# Patient Record
Sex: FEMALE | Race: BLACK OR AFRICAN AMERICAN | Employment: FULL TIME | URBAN - METROPOLITAN AREA
[De-identification: names, ages, dates, MRNs, and addresses within clinical notes are randomized per-mention and may not be internally consistent; named-entity substitution may affect disease eponyms.]

---

## 2018-08-13 ENCOUNTER — OFFICE VISIT (OUTPATIENT)
Dept: FAMILY MEDICINE CLINIC | Age: 28
End: 2018-08-13
Payer: COMMERCIAL

## 2018-08-13 ENCOUNTER — TELEPHONE (OUTPATIENT)
Dept: FAMILY MEDICINE CLINIC | Age: 28
End: 2018-08-13

## 2018-08-13 VITALS
HEART RATE: 71 BPM | BODY MASS INDEX: 17.52 KG/M2 | OXYGEN SATURATION: 98 % | SYSTOLIC BLOOD PRESSURE: 108 MMHG | WEIGHT: 109 LBS | DIASTOLIC BLOOD PRESSURE: 60 MMHG | RESPIRATION RATE: 16 BRPM | HEIGHT: 66 IN

## 2018-08-13 DIAGNOSIS — Z23 NEED FOR TDAP VACCINATION: ICD-10-CM

## 2018-08-13 DIAGNOSIS — Z76.89 ENCOUNTER TO ESTABLISH CARE: Primary | ICD-10-CM

## 2018-08-13 DIAGNOSIS — F32.A DEPRESSION, UNSPECIFIED DEPRESSION TYPE: ICD-10-CM

## 2018-08-13 DIAGNOSIS — R06.83 SNORING: ICD-10-CM

## 2018-08-13 DIAGNOSIS — R11.0 NAUSEA: ICD-10-CM

## 2018-08-13 DIAGNOSIS — Z11.4 SCREENING FOR HIV (HUMAN IMMUNODEFICIENCY VIRUS): ICD-10-CM

## 2018-08-13 DIAGNOSIS — L29.9 ITCHING: ICD-10-CM

## 2018-08-13 DIAGNOSIS — N92.6 IRREGULAR MENSTRUAL BLEEDING: ICD-10-CM

## 2018-08-13 LAB
CONTROL: NORMAL
PREGNANCY TEST URINE, POC: NEGATIVE

## 2018-08-13 PROCEDURE — 99204 OFFICE O/P NEW MOD 45 MIN: CPT | Performed by: FAMILY MEDICINE

## 2018-08-13 PROCEDURE — G8419 CALC BMI OUT NRM PARAM NOF/U: HCPCS | Performed by: FAMILY MEDICINE

## 2018-08-13 PROCEDURE — 90471 IMMUNIZATION ADMIN: CPT | Performed by: FAMILY MEDICINE

## 2018-08-13 PROCEDURE — G8427 DOCREV CUR MEDS BY ELIG CLIN: HCPCS | Performed by: FAMILY MEDICINE

## 2018-08-13 PROCEDURE — 1036F TOBACCO NON-USER: CPT | Performed by: FAMILY MEDICINE

## 2018-08-13 PROCEDURE — 81025 URINE PREGNANCY TEST: CPT | Performed by: FAMILY MEDICINE

## 2018-08-13 PROCEDURE — 90715 TDAP VACCINE 7 YRS/> IM: CPT | Performed by: FAMILY MEDICINE

## 2018-08-13 RX ORDER — RANITIDINE 150 MG/1
TABLET ORAL
Refills: 4 | COMMUNITY
Start: 2018-08-08 | End: 2019-03-26

## 2018-08-13 RX ORDER — NORETHINDRONE ACETATE AND ETHINYL ESTRADIOL 1; 20 MG/1; UG/1
TABLET ORAL
Refills: 1 | COMMUNITY
Start: 2018-08-08 | End: 2018-11-12 | Stop reason: SDUPTHER

## 2018-08-13 RX ORDER — OMEPRAZOLE 20 MG/1
20 CAPSULE, DELAYED RELEASE ORAL DAILY
Qty: 30 CAPSULE | Refills: 3 | Status: SHIPPED | OUTPATIENT
Start: 2018-08-13 | End: 2018-11-30

## 2018-08-13 RX ORDER — FLUTICASONE PROPIONATE 50 MCG
1 SPRAY, SUSPENSION (ML) NASAL DAILY
Qty: 1 BOTTLE | Refills: 3 | Status: SHIPPED | OUTPATIENT
Start: 2018-08-13

## 2018-08-13 RX ORDER — ESCITALOPRAM OXALATE 20 MG/1
20 TABLET ORAL DAILY
COMMUNITY
End: 2019-03-26

## 2018-08-13 RX ORDER — CETIRIZINE HYDROCHLORIDE 10 MG/1
TABLET ORAL
Refills: 4 | COMMUNITY
Start: 2018-08-08 | End: 2019-04-05 | Stop reason: ALTCHOICE

## 2018-08-13 ASSESSMENT — ENCOUNTER SYMPTOMS
COUGH: 0
EYE PAIN: 0
WHEEZING: 0
VOMITING: 0
CONSTIPATION: 0
DIARRHEA: 0
DOUBLE VISION: 0
SHORTNESS OF BREATH: 0
NAUSEA: 1
BLURRED VISION: 0
BACK PAIN: 0

## 2018-08-13 ASSESSMENT — PATIENT HEALTH QUESTIONNAIRE - PHQ9
SUM OF ALL RESPONSES TO PHQ QUESTIONS 1-9: 2
2. FEELING DOWN, DEPRESSED OR HOPELESS: 2
SUM OF ALL RESPONSES TO PHQ9 QUESTIONS 1 & 2: 2
1. LITTLE INTEREST OR PLEASURE IN DOING THINGS: 0
SUM OF ALL RESPONSES TO PHQ QUESTIONS 1-9: 2

## 2018-08-13 NOTE — PROGRESS NOTES
detergents, perfumes   - Improves with cetirizine and ranitidine   - Nothing worsens hives  - OTC creams don't work   - Saw allergist in [place], who prescribed antihistamines (ranitidine and cetirizine)  - Patient reports that she received blood testing for allergies, but had difficulty contacting allergist's office for concrete results      4. Nausea  - Has been occurring for 3 weeks   - Nausea \"comes and goes\"  - Denies vomiting, diarrhea, constipation, fevers, chills, diaphoresis, recent sick contacts, and blood in stool  - Patient reports that she sometimes forgets to take her OCPs. She explains that she hasn't had any \"real\" menstrual cycles since transitioning from Nexplanon to OCPs as her method of birth control  - Nausea worse with eating and after eating       5. Snoring  - Feels tired when she awakens in the morning  - Patient's partner states that patient snores a lot while sleeping  - Patient awakes with headaches in the morning  - Sometimes she feels like she is in a fog and has difficulty concentrating   - Naps do not help with her fatigue   - She has never had a sleep study    Past Medical History:      Diagnosis Date    Anemia     Depression     Seasonal allergies     Sickle cell trait ([facility] [address]        Past Surgical History:    History reviewed. No pertinent surgical history. Allergies:    Patient has no known allergies. Social History:   Social History     Social History    Marital status:      Spouse name: N/A    Number of children: N/A    Years of education: N/A     Occupational History    Not on file.      Social History Main Topics    Smoking status: Never Smoker    Smokeless tobacco: Never Used    Alcohol use No    Drug use: No    Sexual activity: Yes     Partners: Male     Birth control/ protection: Pill      Comment: Partner      Other Topics Concern    Not on file     Social History Narrative    No narrative on file        Family History:   Family History   Problem Patellar reflexes intact bilaterally. Psychiatric: Normal mood and affect. Behavior is normal. Thought content appropriate. Assessment and Plan     1. Encounter to establish care    2. Irregular menstrual bleeding  Patient reports that she has been experiencing irregular menstrual bleeding and nausea. A urine pregnancy test was ordered in the office, which was negative. - POCT urine pregnancy    3. Itching  As patient has been having intermittent itching for over 2 years with no relief, I am referring her to an allergist for allergy testing.   - [facility] Gemini Quinn MD    4. Snoring  - Based on patient's history regarding her sleep and snoring, I am referring patient for a sleep study to r/o obstructive and central sleep apnea. - Baseline Diagnostic Sleep Study; Future    5. Depression, unspecified depression type  Patient was counseled to take her Lexapro on a daily basis. Referral was made for counseling for depression.  - External Referral To Psychology    6. Screening for HIV (human immunodeficiency virus)  - HIV-1 AND HIV-2 ANTIBODIES; Future    7. Need for Tdap vaccination  - Tdap (age 6y and older) IM ([address])    8. Nausea  Will start a trial of PPI to see if patient's symptoms improve. If no improvement, further evaluation, such as an endoscopy, may be warranted. - omeprazole (PRILOSEC) 20 MG delayed release capsule; Take 1 capsule by mouth daily  Dispense: 30 capsule; Refill: 3      Counseled regarding above diagnosis, including possible risks and complications,  especially if left uncontrolled. Counseled regarding the possible side effects, risks, benefits and alternatives to treatment; patient and/or guardian verbalizes understanding, agrees, feels comfortable with and wishes to proceed with above treatment plan. Patient and/or guardian given opportunity to ask questions/raise concerns.   The patient verbalized comfort and understanding of instructions. Return to Office: Schedule well-woman exam at earliest convenience. Follow-up for mood, skin, nausea, and snoring in 3 months or sooner if symptoms worsen. Medication List:    Current Outpatient Prescriptions   Medication Sig Dispense Refill    cetirizine (ZYRTEC) 10 MG tablet TAKE 1 TABLET BY MOUTH TWICE A DAY FOR CHRONIC HIVES  4    ranitidine (ZANTAC) 150 MG tablet TAKE 1 TABLET BY MOUTH TWICE A DAY FOR CHRONIC HIVES  4    JUNEL 1/20 1-20 MG-MCG per tablet TAKE 1 TABLET BY MOUTH DAILY AS DIRECTED  1    escitalopram (LEXAPRO) 20 MG tablet Take 20 mg by mouth daily      omeprazole (PRILOSEC) 20 MG delayed release capsule Take 1 capsule by mouth daily 30 capsule 3    fluticasone (FLONASE) 50 MCG/ACT nasal spray 1 spray by Nasal route daily 1 Bottle 3     No current facility-administered medications for this visit.          DO Sivan   8/13/18  6:39 PM

## 2018-08-19 NOTE — PROGRESS NOTES
Okay! Thanks for letting me know! And yes, I still have a lot to learn in regards to billing and coding!

## 2018-08-24 ENCOUNTER — HOSPITAL ENCOUNTER (OUTPATIENT)
Age: 28
Discharge: HOME | End: 2018-08-24
Payer: COMMERCIAL

## 2018-08-24 ENCOUNTER — OFFICE VISIT (OUTPATIENT)
Dept: FAMILY MEDICINE CLINIC | Age: 28
End: 2018-08-24
Payer: COMMERCIAL

## 2018-08-24 ENCOUNTER — HOSPITAL ENCOUNTER (OUTPATIENT)
Age: 28
Discharge: HOME | End: 2018-08-26
Payer: COMMERCIAL

## 2018-08-24 VITALS
BODY MASS INDEX: 17.68 KG/M2 | HEART RATE: 83 BPM | OXYGEN SATURATION: 98 % | RESPIRATION RATE: 18 BRPM | WEIGHT: 110 LBS | DIASTOLIC BLOOD PRESSURE: 58 MMHG | HEIGHT: 66 IN | SYSTOLIC BLOOD PRESSURE: 96 MMHG

## 2018-08-24 DIAGNOSIS — N92.6 IRREGULAR MENSTRUAL BLEEDING: ICD-10-CM

## 2018-08-24 DIAGNOSIS — Z87.42 HISTORY OF ABNORMAL CERVICAL PAP SMEAR: Primary | ICD-10-CM

## 2018-08-24 DIAGNOSIS — Z11.4 ENCOUNTER FOR SCREENING FOR HIV: ICD-10-CM

## 2018-08-24 LAB — TSH SERPL DL<=0.05 MIU/L-ACNC: 0.88 UIU/ML (ref 0.27–4.2)

## 2018-08-24 PROCEDURE — 36415 COLL VENOUS BLD VENIPUNCTURE: CPT

## 2018-08-24 PROCEDURE — 86703 HIV-1/HIV-2 1 RESULT ANTBDY: CPT

## 2018-08-24 PROCEDURE — 1036F TOBACCO NON-USER: CPT | Performed by: FAMILY MEDICINE

## 2018-08-24 PROCEDURE — G8427 DOCREV CUR MEDS BY ELIG CLIN: HCPCS | Performed by: FAMILY MEDICINE

## 2018-08-24 PROCEDURE — 87624 HPV HI-RISK TYP POOLED RSLT: CPT

## 2018-08-24 PROCEDURE — G8419 CALC BMI OUT NRM PARAM NOF/U: HCPCS | Performed by: FAMILY MEDICINE

## 2018-08-24 PROCEDURE — 84443 ASSAY THYROID STIM HORMONE: CPT

## 2018-08-24 PROCEDURE — G0123 SCREEN CERV/VAG THIN LAYER: HCPCS

## 2018-08-24 PROCEDURE — 99213 OFFICE O/P EST LOW 20 MIN: CPT | Performed by: FAMILY MEDICINE

## 2018-08-27 LAB — HIV-1 AND HIV-2 ANTIBODIES: NORMAL

## 2018-08-30 LAB
CORRESPONDING PAP CASE #: NORMAL
HPV, HIGH RISK: POSITIVE

## 2018-09-03 ENCOUNTER — APPOINTMENT (OUTPATIENT)
Dept: GENERAL RADIOLOGY | Age: 28
End: 2018-09-03
Payer: COMMERCIAL

## 2018-09-03 ENCOUNTER — HOSPITAL ENCOUNTER (EMERGENCY)
Age: 28
Discharge: HOME | End: 2018-09-03
Attending: EMERGENCY MEDICINE
Payer: COMMERCIAL

## 2018-09-03 VITALS
DIASTOLIC BLOOD PRESSURE: 64 MMHG | WEIGHT: 110 LBS | RESPIRATION RATE: 14 BRPM | BODY MASS INDEX: 17.68 KG/M2 | SYSTOLIC BLOOD PRESSURE: 112 MMHG | HEART RATE: 76 BPM | HEIGHT: 66 IN | OXYGEN SATURATION: 98 % | TEMPERATURE: 98.7 F

## 2018-09-03 DIAGNOSIS — B37.9 YEAST INFECTION: ICD-10-CM

## 2018-09-03 DIAGNOSIS — N89.8 VAGINAL DISCHARGE: ICD-10-CM

## 2018-09-03 DIAGNOSIS — R05.9 COUGH: Primary | ICD-10-CM

## 2018-09-03 LAB
BACTERIA: ABNORMAL /HPF
BILIRUBIN URINE: NEGATIVE
BLOOD, URINE: ABNORMAL
CHP ED QC CHECK: NORMAL
CHP ED QC CHECK: YES
CLARITY: CLEAR
CLUE CELLS: NORMAL
COLOR: YELLOW
EPITHELIAL CELLS, UA: ABNORMAL /HPF
GLUCOSE URINE: NEGATIVE MG/DL
KETONES, URINE: NEGATIVE MG/DL
LEUKOCYTE ESTERASE, URINE: ABNORMAL
NITRITE, URINE: NEGATIVE
PH UA: 6.5 (ref 5–9)
PREGNANCY TEST URINE, POC: NEGATIVE
PREGNANCY TEST URINE, POC: NEGATIVE
PROTEIN UA: NEGATIVE MG/DL
RBC UA: ABNORMAL /HPF (ref 0–2)
SOURCE WET PREP: NORMAL
SPECIFIC GRAVITY UA: 1.01 (ref 1–1.03)
TRICHOMONAS PREP: NORMAL
UROBILINOGEN, URINE: 1 E.U./DL
WBC UA: ABNORMAL /HPF (ref 0–5)
YEAST WET PREP: NORMAL

## 2018-09-03 PROCEDURE — 71045 X-RAY EXAM CHEST 1 VIEW: CPT

## 2018-09-03 PROCEDURE — 6370000000 HC RX 637 (ALT 250 FOR IP): Performed by: EMERGENCY MEDICINE

## 2018-09-03 PROCEDURE — 2500000003 HC RX 250 WO HCPCS: Performed by: EMERGENCY MEDICINE

## 2018-09-03 PROCEDURE — 87491 CHLMYD TRACH DNA AMP PROBE: CPT

## 2018-09-03 PROCEDURE — 81001 URINALYSIS AUTO W/SCOPE: CPT

## 2018-09-03 PROCEDURE — 96372 THER/PROPH/DIAG INJ SC/IM: CPT

## 2018-09-03 PROCEDURE — 73562 X-RAY EXAM OF KNEE 3: CPT

## 2018-09-03 PROCEDURE — 6360000002 HC RX W HCPCS: Performed by: EMERGENCY MEDICINE

## 2018-09-03 PROCEDURE — 87210 SMEAR WET MOUNT SALINE/INK: CPT

## 2018-09-03 PROCEDURE — 87591 N.GONORRHOEAE DNA AMP PROB: CPT

## 2018-09-03 PROCEDURE — 99283 EMERGENCY DEPT VISIT LOW MDM: CPT

## 2018-09-03 RX ORDER — AZITHROMYCIN 250 MG/1
1000 TABLET, FILM COATED ORAL ONCE
Status: COMPLETED | OUTPATIENT
Start: 2018-09-03 | End: 2018-09-03

## 2018-09-03 RX ORDER — IBUPROFEN 800 MG/1
800 TABLET ORAL ONCE
Status: COMPLETED | OUTPATIENT
Start: 2018-09-03 | End: 2018-09-03

## 2018-09-03 RX ORDER — FLUCONAZOLE 100 MG/1
200 TABLET ORAL ONCE
Status: COMPLETED | OUTPATIENT
Start: 2018-09-03 | End: 2018-09-03

## 2018-09-03 RX ADMIN — FLUCONAZOLE 200 MG: 100 TABLET ORAL at 14:20

## 2018-09-03 RX ADMIN — AZITHROMYCIN 1000 MG: 250 TABLET, FILM COATED ORAL at 13:35

## 2018-09-03 RX ADMIN — LIDOCAINE HYDROCHLORIDE 250 MG: 10 INJECTION, SOLUTION INFILTRATION; PERINEURAL at 13:35

## 2018-09-03 RX ADMIN — IBUPROFEN 800 MG: 800 TABLET ORAL at 12:44

## 2018-09-03 ASSESSMENT — ENCOUNTER SYMPTOMS
BACK PAIN: 0
ABDOMINAL PAIN: 0
COUGH: 0
DIARRHEA: 0
BLOOD IN STOOL: 0
CONSTIPATION: 0
VOMITING: 0
SHORTNESS OF BREATH: 0
NAUSEA: 0

## 2018-09-03 ASSESSMENT — PAIN DESCRIPTION - LOCATION: LOCATION: KNEE

## 2018-09-03 ASSESSMENT — PAIN DESCRIPTION - ORIENTATION: ORIENTATION: LEFT

## 2018-09-03 ASSESSMENT — PAIN SCALES - GENERAL
PAINLEVEL_OUTOF10: 5
PAINLEVEL_OUTOF10: 5

## 2018-09-03 ASSESSMENT — PAIN DESCRIPTION - PAIN TYPE: TYPE: ACUTE PAIN

## 2018-09-03 ASSESSMENT — PAIN DESCRIPTION - DESCRIPTORS: DESCRIPTORS: THROBBING

## 2018-09-03 ASSESSMENT — PAIN DESCRIPTION - FREQUENCY: FREQUENCY: INTERMITTENT

## 2018-09-03 NOTE — ED NOTES
Bed: 28  Expected date:   Expected time:   Means of arrival:   Comments:  Exam in process     Estephania Martínez LPN  [id number] [phone]

## 2018-09-03 NOTE — ED PROVIDER NOTES
49-year-old female presents ED with chief complaint of vaginal discharge yellow in color for the past 3 days. Patient states she is also been itching and burning in the vagina. She does have a history of BV and yeast infections. Patient recently had a colposcopy and Pap smear done that showed HPV. In addition, patient also states she has a history of sinus infections and has had sinus congestion and cough as well as chest congestion. No fever at home. No trouble breathing. No chest pain. No dysuria. Review of Systems   Constitutional: Negative for chills and fever. Respiratory: Negative for cough and shortness of breath. Cardiovascular: Negative for chest pain. Gastrointestinal: Negative for abdominal pain, blood in stool, constipation, diarrhea, nausea and vomiting. Genitourinary: Positive for vaginal discharge (Yellowish vaginal discharge with itching and burning, cough and left knee pain). Negative for dysuria, frequency, hematuria, pelvic pain, vaginal bleeding and vaginal pain. Musculoskeletal: Negative for back pain. Skin: Negative for rash. Neurological: Negative for dizziness, weakness, light-headedness and headaches. All other systems reviewed and are negative. Physical Exam   Constitutional: She appears well-developed and well-nourished. HENT:   Head: Normocephalic and atraumatic. Eyes: Conjunctivae are normal.   Neck: Normal range of motion. Neck supple. Cardiovascular: Normal rate, regular rhythm and normal heart sounds. No murmur heard. Pulmonary/Chest: Effort normal and breath sounds normal. No respiratory distress. She has no wheezes. She has no rales. Abdominal: Soft. Bowel sounds are normal. There is no tenderness. There is no rebound and no guarding. Genitourinary: Vaginal discharge (Abnormal-appearing cervix with excoriation, yellowish vaginal discharge, and yeast) found. Musculoskeletal: She exhibits no edema. Neurological: She is alert. Skin: Skin is warm and dry. Nursing note and vitals reviewed. Procedures  --------------------------------------------- PAST HISTORY ---------------------------------------------  Past Medical History:  has a past medical history of Anemia; Depression; Seasonal allergies; and Sickle cell trait ([facility] [address] Past Surgical History:  has no past surgical history on file. Social History:  reports that she has never smoked. She has never used smokeless tobacco. She reports that she does not drink alcohol or use drugs. Family History: family history includes Asthma in her brother; Cancer in her maternal aunt; Hypertension in her maternal grandmother; Lupus in her maternal cousin and maternal cousin; No Known Problems in her brother, brother, and sister; Other in her sister; Thyroid Disease in her mother. The patients home medications have been reviewed. Allergies: Patient has no known allergies.     -------------------------------------------------- RESULTS -------------------------------------------------  Labs:  Results for orders placed or performed during the hospital encounter of 09/03/18   Wet prep, genital   Result Value Ref Range    Trichomonas Prep None Seen     Yeast, Wet Prep None Seen     Clue Cells, Wet Prep None Seen     Source Wet Prep VAGINAL    Urinalysis with Microscopic   Result Value Ref Range    Color, UA Yellow Straw/Yellow    Clarity, UA Clear Clear    Glucose, Ur Negative Negative mg/dL    Bilirubin Urine Negative Negative    Ketones, Urine Negative Negative mg/dL    Specific Gravity, UA 1.015 1.005 - 1.030    Blood, Urine TRACE-LYSED Negative    pH, UA 6.5 5.0 - 9.0    Protein, UA Negative Negative mg/dL    Urobilinogen, Urine 1.0 <2.0 E.U./dL    Nitrite, Urine Negative Negative    Leukocyte Esterase, Urine SMALL (A) Negative    WBC, UA 1-3 0 - 5 /HPF    RBC, UA 0-1 0 - 2 /HPF    Epi Cells RARE /HPF    Bacteria, UA RARE (A) /HPF   POC Pregnancy Urine Qual   Result Value Ref Range

## 2018-09-06 ENCOUNTER — TELEPHONE (OUTPATIENT)
Dept: FAMILY MEDICINE CLINIC | Age: 28
End: 2018-09-06

## 2018-09-18 ENCOUNTER — TELEPHONE (OUTPATIENT)
Dept: FAMILY MEDICINE CLINIC | Age: 28
End: 2018-09-18

## 2018-09-18 NOTE — TELEPHONE ENCOUNTER
Patient called and wants to know, while here on Friday for her ultrasound, if Dr Margarita Kline can write a small letter stating patient is cleared for employment. She starts a new job on Monday and will need a statement stating she is medically cleared for employment. She wants someone to call her back and let her know if this is possible.  Lazara Fritz [phone]  Thank you

## 2018-09-21 ENCOUNTER — OFFICE VISIT (OUTPATIENT)
Dept: FAMILY MEDICINE CLINIC | Age: 28
End: 2018-09-21
Payer: COMMERCIAL

## 2018-09-21 ENCOUNTER — HOSPITAL ENCOUNTER (OUTPATIENT)
Age: 28
Discharge: HOME | End: 2018-09-23
Payer: COMMERCIAL

## 2018-09-21 VITALS
BODY MASS INDEX: 17.68 KG/M2 | WEIGHT: 110 LBS | SYSTOLIC BLOOD PRESSURE: 98 MMHG | TEMPERATURE: 98.6 F | OXYGEN SATURATION: 98 % | RESPIRATION RATE: 16 BRPM | HEIGHT: 66 IN | HEART RATE: 79 BPM | DIASTOLIC BLOOD PRESSURE: 68 MMHG

## 2018-09-21 DIAGNOSIS — R87.629 ABNORMAL PAP SMEAR OF VAGINA: ICD-10-CM

## 2018-09-21 DIAGNOSIS — Z23 NEED FOR INFLUENZA VACCINATION: ICD-10-CM

## 2018-09-21 DIAGNOSIS — R87.612 LGSIL ON PAP SMEAR OF CERVIX: Primary | ICD-10-CM

## 2018-09-21 LAB
CONTROL: NORMAL
PREGNANCY TEST URINE, POC: NORMAL

## 2018-09-21 PROCEDURE — 90471 IMMUNIZATION ADMIN: CPT | Performed by: FAMILY MEDICINE

## 2018-09-21 PROCEDURE — 57454 BX/CURETT OF CERVIX W/SCOPE: CPT | Performed by: FAMILY MEDICINE

## 2018-09-21 PROCEDURE — 88305 TISSUE EXAM BY PATHOLOGIST: CPT

## 2018-09-21 PROCEDURE — 81025 URINE PREGNANCY TEST: CPT | Performed by: FAMILY MEDICINE

## 2018-09-21 PROCEDURE — 90686 IIV4 VACC NO PRSV 0.5 ML IM: CPT | Performed by: FAMILY MEDICINE

## 2018-09-21 NOTE — PATIENT INSTRUCTIONS
time you have sex. Use them from the beginning to the end of sexual contact. · Be sure to tell your sexual partner or partners that you have HPV. Even if you do not have symptoms, you can still pass HPV to others. · Having one sex partner (who does not have STIs and does not have sex with anyone else) is a good way to avoid STIs. When should you call for help? Watch closely for changes in your health, and be sure to contact your doctor if:    · You have vaginal pain during or after sex.     · You have vaginal bleeding when you are not in your menstrual period. Where can you learn more? Go to https://chpeodiliaeb.[facility]. org and sign in to your [facility] account. Enter F690 in the Sensorin box to learn more about \"Human Papillomavirus (HPV): Care Instructions. \"     If you do not have an account, please click on the \"Sign Up Now\" link. Current as of: November 27, 2017  Content Version: 11.7  © 4895-9740 [employer]. Care instructions adapted under license by [facility] ([facility]). If you have questions about a medical condition or this instruction, always ask your healthcare professional. [place] [address] any warranty or liability for your use of this information. Abnormal Pap Test: Care Instructions  Your Care Instructions    A Pap test (also called a Pap smear) is used to look for early changes that may become cancer of the cervix. Your Pap test was abnormal. That may mean that some cells in your cervix have changed. The cell changes are most often caused by human papillomavirus (HPV) infection. An abnormal Pap test does not mean that the abnormal cells will lead to cancer. Changes in cervical cells may go away on their own or may progress slowly. Your doctor may want to repeat the Pap test or have you take other tests to see if you have cell changes.   It is very important that you have regular Pap tests after you've had an abnormal Pap test.  Follow-up care

## 2018-09-21 NOTE — PROGRESS NOTES
Chief complaint : Presents for colposcopy . The patient is fully informed about the indications, risks and alternatives . The risks include but are not limited to bleeding and infection. A urine pregnancy test is negative. The indication is a pap smear showing  : LGSIL and positive HPV    Procedure :    General inspection : lesion at lower lip  After application of acetic acid, the following is noted :extensive AWE posterior cervix. The colposcopy is satisfactory. Biopsy  obtained at 9 o'clock. Hemostasis secured with application of silver nitrate. Assessment : LGSIL. Plan :follow up in 2 weeks for US and ECC. The patient is again reminded of the importance of close follow-up including repeat Pap smears in order to help reduce the risk of cervical cancer.

## 2018-09-21 NOTE — PROGRESS NOTES
[facility]  Department of Family Medicine  Family Medicine Residency Program      Patient:  Donato Rivera 32 y.o. female     Date of Service: 9/21/18      Chief complaint:   Chief Complaint   Patient presents with    Abnormal Pap Smear     Colposcopy    Forms     Needs a letter stating that she is physically able to work with no restrictions       History of Present Illness   The patient is a 32 y.o. female with a hx of abnormal Pap smears in the past who presents to the clinic today for a colposcopy due to a recent abnormal Pap smear showing LGSIL with positive HPV. -Patient reports that she is still having irregular menstrual bleeding   -Has been remembering to take her OCPs every day at the same time   -Denies hematuria, dysuria, urinary frequency, urinary urgency, history of STIs, genital lesions, genital itching, nipple discharge, breast lumps/masses, breast dimpling, and axillary lymphadenopathy  -Admits to creamy vaginal discharge and a tingling sensation in the genital region    Review of Systems:   Review of Systems   Genitourinary: Negative for dysuria, frequency, hematuria and urgency. Denies genital lesions, genital itching, and history of STIs. Admits to creamy vaginal discharge, irregular menstrual bleeding, and a tingling sensation in the genital region. Breast: Denies nipple discharge, breast masses/lumps, breast dimpling, and axillary lymphadenopathy     Past Medical History:      Diagnosis Date    Anemia     Depression     Seasonal allergies     Sickle cell trait (HCC)        Past Surgical History:    No past surgical history on file. Allergies:    Patient has no known allergies. Social History:   Social History     Social History    Marital status:      Spouse name: N/A    Number of children: 2    Years of education: N/A     Occupational History    Not on file.      Social History Main Topics    Smoking status: Never Smoker    Smokeless tobacco: Never Used    Alcohol use No    Drug use: No    Sexual activity: Yes     Partners: Male     Birth control/ protection: Pill      Comment: Partner      Other Topics Concern    Not on file     Social History Narrative    No narrative on file        Family History:   Family History   Problem Relation Age of Onset    Thyroid Disease Mother     Other Sister         Heart murmur as a child    Asthma Brother     Hypertension Maternal Grandmother     Cancer Maternal Aunt     No Known Problems Sister     No Known Problems Brother     No Known Problems Brother     Lupus Maternal Cousin     Lupus Maternal Cousin        Physical Exam   Vitals: BP 98/68   Pulse 79   Temp 98.6 °F (37 °C) (Oral)   Resp 16   Ht 5' 6\" (1.676 m)   Wt 110 lb (49.9 kg)   LMP 09/04/2018 (Exact Date)   SpO2 98%   Breastfeeding? No   BMI 17.75 kg/m²    General Appearance: Well developed, awake, alert, no acute distress  Breast Exam: Bilateral breasts are symmetric without dimpling or nipple retractions. No palpable masses or nodules. No skin changes. No nipple discharge. Axilla free of lymphadenopathy. : Colposcopy performed. Cervix shows diffuse acetowhite changes with application of acetic acid. Biopsy performed. See note from Dr. Autumn Brannon for additional details. Assessment and Plan       1. LGSIL on Pap smear of cervix  - [address] - [place] COLPOSC,CERVIX W/ADJ VAG,W/BX & CURRETAG  - SURGICAL PATHOLOGY; Future   Will contact patient with result of colposcopy once it becomes available. Depending on results, patient made need a referral to OBGYN for possible LEEP. 2. Abnormal Pap smear of vagina  - POCT urine pregnancy  Urine pregnancy test today was negative, so we proceeded with colposcopy. 3. Need for influenza vaccination  - INFLUENZA, QUADV, 3 YRS AND OLDER, IM, PF, PREFILL SYR OR SDV, 0.5ML (FLUZONE QUADV, PF)    Patient signed a release of records today. Will review those records once I receive them.  A

## 2018-09-24 ENCOUNTER — HOSPITAL ENCOUNTER (OUTPATIENT)
Dept: SLEEP CENTER | Age: 28
Discharge: HOME | End: 2018-09-24
Payer: COMMERCIAL

## 2018-09-24 DIAGNOSIS — R06.83 SNORING: ICD-10-CM

## 2018-09-24 PROCEDURE — 95810 POLYSOM 6/> YRS 4/> PARAM: CPT

## 2018-09-25 VITALS
WEIGHT: 110 LBS | OXYGEN SATURATION: 97 % | HEIGHT: 66 IN | BODY MASS INDEX: 17.68 KG/M2 | DIASTOLIC BLOOD PRESSURE: 70 MMHG | HEART RATE: 70 BPM | SYSTOLIC BLOOD PRESSURE: 110 MMHG

## 2018-09-25 ASSESSMENT — SLEEP AND FATIGUE QUESTIONNAIRES
HOW LIKELY ARE YOU TO NOD OFF OR FALL ASLEEP WHEN YOU ARE A PASSENGER IN A CAR FOR AN HOUR WITHOUT A BREAK: 3
HOW LIKELY ARE YOU TO NOD OFF OR FALL ASLEEP WHILE SITTING QUIETLY AFTER LUNCH WITHOUT ALCOHOL: 3
HOW LIKELY ARE YOU TO NOD OFF OR FALL ASLEEP WHILE SITTING AND TALKING TO SOMEONE: 3
HOW LIKELY ARE YOU TO NOD OFF OR FALL ASLEEP WHILE WATCHING TV: 3
ESS TOTAL SCORE: 18
HOW LIKELY ARE YOU TO NOD OFF OR FALL ASLEEP WHILE LYING DOWN TO REST IN THE AFTERNOON WHEN CIRCUMSTANCES PERMIT: 3
HOW LIKELY ARE YOU TO NOD OFF OR FALL ASLEEP WHILE SITTING INACTIVE IN A PUBLIC PLACE: 0
HOW LIKELY ARE YOU TO NOD OFF OR FALL ASLEEP WHILE SITTING AND READING: 3
HOW LIKELY ARE YOU TO NOD OFF OR FALL ASLEEP IN A CAR, WHILE STOPPED FOR A FEW MINUTES IN TRAFFIC: 0

## 2018-09-29 NOTE — PROGRESS NOTES
[id number] [address]                                SLEEP STUDY REPORT    PATIENT NAME: Karen Paris                    :        1990  MED REC NO:   [id number]                            ROOM:  ACCOUNT NO:   [de-identified]                           ADMIT DATE: 2018  PROVIDER:     Janet Mar MD    DATE OF STUDY:  2018    REFERRING PROVIDER:  Siva Myles DO    STUDY PERFORMED:  Polysomnography. INDICATION FOR POLYSOMNOGRAPHY:  Excessive napping, wakes gasping, snoring,  morning headache, trouble with memory/concentration, and nocturnal  diaphoresis. CURRENT MEDICATIONS:  Oral contraceptive, Lexapro, omeprazole, Flonase  nasal spray, and cetirizine. INTERPRETATION:  SLEEP ARCHITECTURE:  This patient had a total time in bed of 390 minutes. Total sleep time was 357 minutes. Sleep efficiency was 92%. Sleep latency  was 16 minutes. REM latency was 87 minutes. SLEEP STAGING:  The patient was awake 8% of the time in bed. Stage N1 was  3% N2 56% of the total sleep time. Slow wave sleep was 21% of the sleep  time and stage REM sleep was 20% of the sleep time. RESPIRATION SUMMARY:  APNEA:  There were three apneic events. Two were central and one  obstructive. All events occurred during REM stage sleep. Maximum duration  of an apneic event was 10 seconds. HYPOPNEA:  There were no hypopneic events. APNEA/HYPOPNEA INDEX:  The apnea/hypopnea index was less than one. All  events occurred in the supine position. AROUSAL SUMMARY:  During sleep, the patient had a total of 30  arousals/awakenings. The sleep disruption index was normal.    LIMB MOVEMENT:  There were nine total limb movements, the limb movement  index was normal.    OXYGEN SATURATION:  Average oxygen saturation while awake was 98%. Average  saturation during sleep was 97%. Minimum saturation was 95%.     HEART RATE SUMMARY:  Average baseline heart rate was 80 beats per minute. Maximum heart rate was 106 beats per minute and minimum heartbeat was 60  beats per minute. MISCELLANEOUS:  [place] sleepiness scale score is 18/24. Snoring was  moderate. This was graded as 6 on a 1 to 10 scale. There was no apparent  bruxism. IMPRESSION:  1. No evidence of sleep apnea syndrome. 2.  Excellent oxygen saturation. 3.  No cardiac dysrhythmia. 4.  Moderate snoring. DISCUSSION:  This patient has an apnea/hypopnea index of less than one. Normal is less than five leaving this patient in the normal category. Along with this, there was excellent oxygen saturation and no cardiac  dysrhythmia. Therefore, there is no indication that the patient should be  treated for sleep apnea syndrome. If snoring is bothersome to the patient  or bed partner, consider treating with nasal steroids, an oral appliance,  or even a snore pillow, which is now commercially available. SUGGESTIONS:  1.  Dr. Kavya Horowitz to discuss the results of study with the  patient. 2.  No indication to treat for sleep apnea syndrome. 3.  If snoring is bothersome to the patient or bed partner, consider  treating as described in the discussion above.         Amelie Carmona MD  Diplomat of Sleep Medicine    D: 09/28/2018 16:48:26       T: 09/28/2018 16:51:02     [email or web address]  Job#: [id number]     Doc#: [id number]    CC:

## 2018-10-01 ENCOUNTER — TELEPHONE (OUTPATIENT)
Dept: FAMILY MEDICINE CLINIC | Age: 28
End: 2018-10-01

## 2018-10-01 NOTE — TELEPHONE ENCOUNTER
Patient called and left a message. Stating that the bleeding started Friday as spotting, then by Sunday it was heavy bleeding.

## 2018-10-01 NOTE — TELEPHONE ENCOUNTER
Left detailed message for patient stating that we need to know how much bleeding she is having. If it is just spotting, if she is passing clots or is it heavy bleeding. To please give the office a call back and let us know.

## 2018-10-04 ENCOUNTER — OFFICE VISIT (OUTPATIENT)
Dept: FAMILY MEDICINE CLINIC | Age: 28
End: 2018-10-04
Payer: COMMERCIAL

## 2018-10-04 VITALS
RESPIRATION RATE: 16 BRPM | WEIGHT: 110 LBS | SYSTOLIC BLOOD PRESSURE: 102 MMHG | DIASTOLIC BLOOD PRESSURE: 64 MMHG | HEART RATE: 70 BPM | OXYGEN SATURATION: 98 % | HEIGHT: 66 IN | BODY MASS INDEX: 17.68 KG/M2

## 2018-10-04 DIAGNOSIS — Z98.890 H/O COLPOSCOPY WITH CERVICAL BIOPSY: ICD-10-CM

## 2018-10-04 DIAGNOSIS — R51.9 NONINTRACTABLE EPISODIC HEADACHE, UNSPECIFIED HEADACHE TYPE: ICD-10-CM

## 2018-10-04 DIAGNOSIS — N92.6 MENSTRUAL CYCLE PROBLEM: Primary | ICD-10-CM

## 2018-10-04 LAB
CONTROL: NORMAL
PREGNANCY TEST URINE, POC: NEGATIVE

## 2018-10-04 PROCEDURE — 1036F TOBACCO NON-USER: CPT | Performed by: FAMILY MEDICINE

## 2018-10-04 PROCEDURE — 81025 URINE PREGNANCY TEST: CPT | Performed by: FAMILY MEDICINE

## 2018-10-04 PROCEDURE — G8419 CALC BMI OUT NRM PARAM NOF/U: HCPCS | Performed by: FAMILY MEDICINE

## 2018-10-04 PROCEDURE — 99213 OFFICE O/P EST LOW 20 MIN: CPT | Performed by: FAMILY MEDICINE

## 2018-10-04 PROCEDURE — G8427 DOCREV CUR MEDS BY ELIG CLIN: HCPCS | Performed by: FAMILY MEDICINE

## 2018-10-04 PROCEDURE — G8482 FLU IMMUNIZE ORDER/ADMIN: HCPCS | Performed by: FAMILY MEDICINE

## 2018-10-04 ASSESSMENT — ENCOUNTER SYMPTOMS
BLOOD IN STOOL: 0
VOMITING: 0
PHOTOPHOBIA: 0
BLURRED VISION: 0
SORE THROAT: 0
NAUSEA: 0
SHORTNESS OF BREATH: 0
DOUBLE VISION: 0
COUGH: 0

## 2018-10-04 NOTE — PROGRESS NOTES
[facility]  Department of Family Medicine  Family Medicine Residency Program      Patient:  Kimani Reed 32 y.o. female     Date of Service: 10/4/18      Chief complaint:   Chief Complaint   Patient presents with    Results    Vaginal Bleeding    Headache       History of Present Illness   The patient is a 32 y.o. female who presents to the clinic today for the following concerns:     1. Vaginal bleeding  - S/p colposcopy on 9/21/18  - Light spotting started the next day after colposcopy, which became heavier on s/p day 2.  - Was going through 2 pads per day   - Bleeding started to become lighter around yesterday  - Appointment on 10/22/18 for transvaginal US for complaints of irregular menstrual bleeding at previous visits  - Last dose of OCP this past Sunday. Denies missing any dose prior to that, but hasn't had time to go to pharmacy to  new pack of OCPs. - Denies hematochezia, melena, hematuria, lightheadedness, dizziness, chest pain, SOB    2. Headaches  - Started last week  - Off-and on, some days will have no headache at all  - Located on right side, frontal/temporal region  - Headaches last for 15 minutes or so  - Describes pain as sharp and stabbing with pulsation   - Headaches better with Tylenol (2 tablets) or naproxen   - Headaches worse with kids aggravating her   - Pain scale 5/10   - Denies hx of migraines   - Denies coughing, photophobia, phonophobia, floaters, aura, double vision, blurry vision, ear pain, sore throat, fevers, chills, nausea, vomiting, neck pain, muscle pain   - Admits to sneezing, rhinorrhea, itchy/watery eyes   - Not sure if due to allergies     Review of Systems:   Review of Systems   Constitutional: Negative for chills and fever. HENT: Negative for ear pain and sore throat. Denies phonophobia. Admits to sneezing and rhinorrhea. Eyes: Negative for blurred vision, double vision and photophobia. Denies floaters.  Admits to itchy, watery eyes. Respiratory: Negative for cough and shortness of breath. Cardiovascular: Negative for chest pain. Gastrointestinal: Negative for blood in stool, melena, nausea and vomiting. Genitourinary: Negative for hematuria. Admits to vaginal bleeding. Musculoskeletal: Negative for myalgias and neck pain. Neurological: Positive for headaches. Negative for dizziness. Denies aura     Past Medical History:      Diagnosis Date    Anemia     Depression     Seasonal allergies     Sickle cell trait ([facility] [address]      Past Surgical History:    No past surgical history on file. Allergies:    Patient has no known allergies. Social History:   Social History     Social History    Marital status:      Spouse name: N/A    Number of children: 2    Years of education: N/A     Occupational History    Not on file. Social History Main Topics    Smoking status: Never Smoker    Smokeless tobacco: Never Used    Alcohol use No    Drug use: No    Sexual activity: Yes     Partners: Male     Birth control/ protection: Pill      Comment: Partner      Other Topics Concern    Not on file     Social History Narrative    No narrative on file      Family History:   Family History   Problem Relation Age of Onset    Thyroid Disease Mother     Other Sister         Heart murmur as a child    Asthma Brother     Hypertension Maternal Grandmother     Cancer Maternal Aunt     No Known Problems Sister     No Known Problems Brother     No Known Problems Brother     Lupus Maternal Cousin     Lupus Maternal Cousin        Physical Exam   Vitals: /64   Pulse 70   Resp 16   Ht 5' 6\" (1.676 m)   Wt 110 lb (49.9 kg)   LMP 09/04/2018 (Exact Date)   SpO2 98%   Breastfeeding? No   BMI 17.75 kg/m²      General Appearance: Well developed, awake, alert, no acute distress  HEENT: Normocephalic, atraumatic. PERRL. EACs without erythema or swelling bilaterally.  TMs without erythema, abnormal Pap smears in the past, as well as a recent abnormal Pap smear and positive HPV testing, she will need another colposcopy. Last colposcopy revealed diffuse acetowhite epithelium. Will have patient schedule an appointment for repeat colposcopy in 6 weeks with Dr. Suad Lyon and me. Patient signed a release of records at last appointment. Records have not yet been received by me. Will review once I receive them. 3. Nonintractable episodic headache, unspecified headache type  As headaches respond to naproxen and Tylenol , I explained to patient that it was okay for her to continue taking either Tylenol or naproxen since she uses them sparingly. Headache may have been due to patient's vaginal bleeding (i.e if she were on her period last week). As patient has environmental allergies and is on Zyrtec and Flonase, headache may be due to the change in the seasons. I advised patient that if her headaches change in frequency, severity, or presentation, she should call the office to make an appointment. Neuro exam was benign today. No need for imaging at this time. Will continue to monitor. Return to Office: Return in about 6 weeks (around 11/15/2018) for repeat colposcopy with me and Dr. Suad Lyon. Medication List:    Current Outpatient Prescriptions   Medication Sig Dispense Refill    cetirizine (ZYRTEC) 10 MG tablet TAKE 1 TABLET BY MOUTH TWICE A DAY FOR CHRONIC HIVES  4    ranitidine (ZANTAC) 150 MG tablet TAKE 1 TABLET BY MOUTH TWICE A DAY FOR CHRONIC HIVES  4    JUNEL 1/20 1-20 MG-MCG per tablet TAKE 1 TABLET BY MOUTH DAILY AS DIRECTED  1    escitalopram (LEXAPRO) 20 MG tablet Take 20 mg by mouth daily      omeprazole (PRILOSEC) 20 MG delayed release capsule Take 1 capsule by mouth daily 30 capsule 3    fluticasone (FLONASE) 50 MCG/ACT nasal spray 1 spray by Nasal route daily 1 Bottle 3     No current facility-administered medications for this visit.          Amalia Major DO   10/4/18  7:58 PM

## 2018-11-12 ENCOUNTER — OFFICE VISIT (OUTPATIENT)
Dept: FAMILY MEDICINE CLINIC | Age: 28
End: 2018-11-12
Payer: COMMERCIAL

## 2018-11-12 VITALS
OXYGEN SATURATION: 98 % | HEART RATE: 74 BPM | HEIGHT: 66 IN | WEIGHT: 110 LBS | DIASTOLIC BLOOD PRESSURE: 70 MMHG | RESPIRATION RATE: 16 BRPM | SYSTOLIC BLOOD PRESSURE: 106 MMHG | BODY MASS INDEX: 17.68 KG/M2 | TEMPERATURE: 97.7 F

## 2018-11-12 DIAGNOSIS — R11.0 NAUSEA: ICD-10-CM

## 2018-11-12 DIAGNOSIS — N94.10 DYSPAREUNIA IN FEMALE: Primary | ICD-10-CM

## 2018-11-12 DIAGNOSIS — F32.A DEPRESSION, UNSPECIFIED DEPRESSION TYPE: ICD-10-CM

## 2018-11-12 LAB
CONTROL: NORMAL
PREGNANCY TEST URINE, POC: NORMAL

## 2018-11-12 PROCEDURE — G8482 FLU IMMUNIZE ORDER/ADMIN: HCPCS | Performed by: FAMILY MEDICINE

## 2018-11-12 PROCEDURE — 1036F TOBACCO NON-USER: CPT | Performed by: FAMILY MEDICINE

## 2018-11-12 PROCEDURE — 81025 URINE PREGNANCY TEST: CPT | Performed by: FAMILY MEDICINE

## 2018-11-12 PROCEDURE — 99213 OFFICE O/P EST LOW 20 MIN: CPT | Performed by: FAMILY MEDICINE

## 2018-11-12 PROCEDURE — G8427 DOCREV CUR MEDS BY ELIG CLIN: HCPCS | Performed by: FAMILY MEDICINE

## 2018-11-12 PROCEDURE — G8419 CALC BMI OUT NRM PARAM NOF/U: HCPCS | Performed by: FAMILY MEDICINE

## 2018-11-12 RX ORDER — NORETHINDRONE ACETATE AND ETHINYL ESTRADIOL 1; 20 MG/1; UG/1
1 TABLET ORAL DAILY
Qty: 63 TABLET | Refills: 2 | Status: SHIPPED | OUTPATIENT
Start: 2018-11-12 | End: 2019-03-26

## 2018-11-12 RX ORDER — OMEPRAZOLE 20 MG/1
20 CAPSULE, DELAYED RELEASE ORAL
Qty: 30 CAPSULE | Refills: 1 | Status: SHIPPED | OUTPATIENT
Start: 2018-11-12 | End: 2019-04-05

## 2018-11-12 ASSESSMENT — ENCOUNTER SYMPTOMS
VOMITING: 0
COUGH: 0
BLOOD IN STOOL: 0
NAUSEA: 1
CONSTIPATION: 0
WHEEZING: 0
SHORTNESS OF BREATH: 0
DIARRHEA: 0
ABDOMINAL PAIN: 0

## 2018-11-12 NOTE — PROGRESS NOTES
clear bilaterally without edema. TMs without erythema or bulging. Nose: Nasal turbinates edematous. Nasal mucosa moist and pink. Throat: Pharynx without erythema or exudate. Some clear drainage noted at posterior pharynx. Lungs: Clear to auscultation bilaterally. Respirations unlabored. No rhonchi/wheezing/rales. Heart:  Regular rate and rhythm. S1and S2 normal. No murmur. Abdomen: Soft, non-tender, bowel sounds normoactive, no masses. Assessment and Plan       1. Dyspareunia in female and vaginal pain   - Differential diagnosis includes endometriosis vs vaginismus   - Advised patient to try using lubricant during intercourse    - Will schedule a transvaginal US with Dr. Radha Ricks and me     2. Nausea  - Patient's complaints of nausea are likely secondary to GERD, as patient's symptoms improved on PPI and returned upon discontinuation of PPI. PPI prescribed again at this visit. Patient instructed to avoid foods like tomato sauce, caffeine, chocolate, and spicy food. Patient also provided with information on GERD. Will follow-up on nausea in 1 month. Depending on the persistence of symptoms at next visit, may need to consult General Surgery for further evaluation.   - POCT urine pregnancy  - omeprazole (PRILOSEC) 20 MG delayed release capsule; Take 1 capsule by mouth every morning (before breakfast)  Dispense: 30 capsule; Refill: 1    3. Depression, unspecified depression type  - Continue Lexapro     Return to Office: Return in about 1 month (around 12/12/2018) for nausea .     Medication List:    Current Outpatient Prescriptions   Medication Sig Dispense Refill    JUNEL 1/20 1-20 MG-MCG per tablet Take 1 tablet by mouth daily 63 tablet 2    omeprazole (PRILOSEC) 20 MG delayed release capsule Take 1 capsule by mouth every morning (before breakfast) 30 capsule 1    cetirizine (ZYRTEC) 10 MG tablet TAKE 1 TABLET BY MOUTH TWICE A DAY FOR CHRONIC HIVES  4    ranitidine (ZANTAC) 150 MG tablet TAKE 1 TABLET BY MOUTH TWICE A DAY FOR CHRONIC HIVES  4    escitalopram (LEXAPRO) 20 MG tablet Take 20 mg by mouth daily      omeprazole (PRILOSEC) 20 MG delayed release capsule Take 1 capsule by mouth daily 30 capsule 3    fluticasone (FLONASE) 50 MCG/ACT nasal spray 1 spray by Nasal route daily 1 Bottle 3     No current facility-administered medications for this visit. Marcin Molina DO   11/12/18  4:06 PM          This document may have been prepared at least partiallythrough the use of voice recognition software. Although effort is taken to assure the accuracy of this document, it is possible that grammatical, syntax,  or spelling errors may occur.

## 2018-11-12 NOTE — PATIENT INSTRUCTIONS
Patient Education        Kegel Exercises: Care Instructions  Your Care Instructions    Kegel exercises strengthen muscles around the bladder. These muscles control the flow of urine. Kegel exercises are sometime called \"pelvic floor\" exercises. They can help prevent urine leakage and keep the pelvic organs in place. A woman who just had a baby might want to try Kegel exercises. They can strengthen pelvic muscles that have been weakened by pregnancy and childbirth. A man or woman may use Kegel exercises to treat urine leakage. You do Kegel exercises by tightening the muscles you use when you urinate. You will likely need to do these exercises for several weeks to get better. Follow-up care is a key part of your treatment and safety. Be sure to make and go to all appointments, and call your doctor if you are having problems. It's also a good idea to know your test results and keep a list of the medicines you take. How can you care for yourself at home? · Do Kegel exercises. ? Find the muscles you need to strengthen. To do this, tighten the muscles that stop your urine while you are going to the bathroom. These are the same muscles you squeeze during Kegel exercises. ? Squeeze the muscles as hard as you can. Your belly and thighs should not move. ? Hold the squeeze for 3 seconds. Then relax for 3 seconds. ? Start with 3 seconds, and then add 1 second each week until you are able to squeeze for 10 seconds. ? Repeat the exercise 10 to 15 times for each session. Do three or more sessions each day. · You can check to see if you are using the right muscles. Place a finger in your vagina and squeeze around it. You are doing them right when you feel pressure around your finger. Your doctor may also suggest that you put special weights in your vagina while you do the exercises. · Do not smoke. It can irritate the bladder. If you need help quitting, talk to your doctor about stop-smoking programs and medicines. These can increase your chances of quitting for good. Where can you learn more? Go to https://chpepiceweb.FanGo. org and sign in to your [employer] account. Enter G336 in the FaceBuzz box to learn more about \"Kegel Exercises: Care Instructions. \"     If you do not have an account, please click on the \"Sign Up Now\" link. Current as of: March 21, 2018  Content Version: 11.8  © 5996-9330 Healthwise, [employer]. Care instructions adapted under license by [facility] ([facility]). If you have questions about a medical condition or this instruction, always ask your healthcare professional. [place] [address] any warranty or liability for your use of this information. Patient Education        Gastroesophageal Reflux Disease (GERD): Care Instructions  Your Care Instructions    Gastroesophageal reflux disease (GERD) is the backward flow of stomach acid into the esophagus. The esophagus is the tube that leads from your throat to your stomach. A one-way valve prevents the stomach acid from moving up into this tube. When you have GERD, this valve does not close tightly enough. If you have mild GERD symptoms including heartburn, you may be able to control the problem with antacids or over-the-counter medicine. Changing your diet, losing weight, and making other lifestyle changes can also help reduce symptoms. Follow-up care is a key part of your treatment and safety. Be sure to make and go to all appointments, and call your doctor if you are having problems. It's also a good idea to know your test results and keep a list of the medicines you take. How can you care for yourself at home? · Take your medicines exactly as prescribed. Call your doctor if you think you are having a problem with your medicine. · Your doctor may recommend over-the-counter medicine. For mild or occasional indigestion, antacids, such as Tums, Gaviscon, Mylanta, or Maalox, may help.  Your doctor also may recommend over-the-counter acid reducers, such as Pepcid AC, Tagamet HB, Zantac 75, or Prilosec. Read and follow all instructions on the label. If you use these medicines often, talk with your doctor. · Change your eating habits. ? It's best to eat several small meals instead of two or three large meals. ? After you eat, wait 2 to 3 hours before you lie down. ? Chocolate, mint, and alcohol can make GERD worse. ? Spicy foods, foods that have a lot of acid (like tomatoes and oranges), and coffee can make GERD symptoms worse in some people. If your symptoms are worse after you eat a certain food, you may want to stop eating that food to see if your symptoms get better. · Do not smoke or chew tobacco. Smoking can make GERD worse. If you need help quitting, talk to your doctor about stop-smoking programs and medicines. These can increase your chances of quitting for good. · If you have GERD symptoms at night, raise the head of your bed 6 to 8 inches by putting the frame on blocks or placing a foam wedge under the head of your mattress. (Adding extra pillows does not work.)  · Do not wear tight clothing around your middle. · Lose weight if you need to. Losing just 5 to 10 pounds can help. When should you call for help? Call your doctor now or seek immediate medical care if:    · You have new or different belly pain.     · Your stools are black and tarlike or have streaks of blood.    Watch closely for changes in your health, and be sure to contact your doctor if:    · Your symptoms have not improved after 2 days.     · Food seems to catch in your throat or chest.   Where can you learn more? Go to https://CorreloranelOptimal Blue.[employer] org and sign in to your [employer] account. Enter J221 in the Xigen box to learn more about \"Gastroesophageal Reflux Disease (GERD): Care Instructions. \"     If you do not have an account, please click on the \"Sign Up Now\" link.   Current as of: March 28, 2018  Content Version:

## 2018-11-30 ENCOUNTER — OFFICE VISIT (OUTPATIENT)
Dept: FAMILY MEDICINE CLINIC | Age: 28
End: 2018-11-30
Payer: COMMERCIAL

## 2018-11-30 ENCOUNTER — HOSPITAL ENCOUNTER (OUTPATIENT)
Age: 28
Discharge: HOME | End: 2018-12-02
Payer: COMMERCIAL

## 2018-11-30 VITALS
DIASTOLIC BLOOD PRESSURE: 70 MMHG | RESPIRATION RATE: 16 BRPM | SYSTOLIC BLOOD PRESSURE: 98 MMHG | WEIGHT: 111 LBS | HEART RATE: 76 BPM | HEIGHT: 66 IN | OXYGEN SATURATION: 98 % | BODY MASS INDEX: 17.84 KG/M2

## 2018-11-30 DIAGNOSIS — R87.612 LGSIL ON PAP SMEAR OF CERVIX: ICD-10-CM

## 2018-11-30 DIAGNOSIS — Z87.42 HISTORY OF ABNORMAL CERVICAL PAP SMEAR: Primary | ICD-10-CM

## 2018-11-30 LAB
APPEARANCE FLUID: ABNORMAL
BILIRUBIN, POC: ABNORMAL
BLOOD URINE, POC: ABNORMAL
CLARITY, POC: ABNORMAL
COLOR, POC: YELLOW
CONTROL: NORMAL
GLUCOSE URINE, POC: ABNORMAL
KETONES, POC: ABNORMAL
LEUKOCYTE EST, POC: ABNORMAL
NITRITE, POC: ABNORMAL
PH, POC: 7
PREGNANCY TEST URINE, POC: NORMAL
PROTEIN, POC: ABNORMAL
SPECIFIC GRAVITY, POC: 1.01
UROBILINOGEN, POC: 2

## 2018-11-30 PROCEDURE — 57454 BX/CURETT OF CERVIX W/SCOPE: CPT | Performed by: FAMILY MEDICINE

## 2018-11-30 PROCEDURE — 81025 URINE PREGNANCY TEST: CPT | Performed by: FAMILY MEDICINE

## 2018-11-30 PROCEDURE — 88305 TISSUE EXAM BY PATHOLOGIST: CPT

## 2018-11-30 PROCEDURE — 81002 URINALYSIS NONAUTO W/O SCOPE: CPT | Performed by: FAMILY MEDICINE

## 2018-12-05 NOTE — PROGRESS NOTES
Call patient  Biopsy shows a mild dysplasia as expected  We will discuss in more detail at next apt  Nothing to worry about  Will need repeat pap in one year. Thanks.

## 2019-01-08 NOTE — TELEPHONE ENCOUNTER
Left message for patient to return call.  -patient also called stating that she is still bleeding from the procedure Friday.   Need to see how much bleeding etc no

## 2019-01-15 ENCOUNTER — TELEPHONE (OUTPATIENT)
Dept: ADMINISTRATIVE | Age: 29
End: 2019-01-15

## 2019-02-19 ENCOUNTER — TELEPHONE (OUTPATIENT)
Dept: FAMILY MEDICINE CLINIC | Age: 29
End: 2019-02-19

## 2019-03-26 ENCOUNTER — OFFICE VISIT (OUTPATIENT)
Dept: FAMILY MEDICINE CLINIC | Age: 29
End: 2019-03-26
Payer: COMMERCIAL

## 2019-03-26 ENCOUNTER — TELEPHONE (OUTPATIENT)
Dept: FAMILY MEDICINE CLINIC | Age: 29
End: 2019-03-26

## 2019-03-26 VITALS
HEART RATE: 76 BPM | WEIGHT: 108 LBS | SYSTOLIC BLOOD PRESSURE: 105 MMHG | DIASTOLIC BLOOD PRESSURE: 49 MMHG | RESPIRATION RATE: 16 BRPM | HEIGHT: 66 IN | BODY MASS INDEX: 17.36 KG/M2

## 2019-03-26 DIAGNOSIS — N92.6 IRREGULAR MENSTRUAL BLEEDING: ICD-10-CM

## 2019-03-26 DIAGNOSIS — N92.6 MENSTRUAL CYCLE PROBLEM: ICD-10-CM

## 2019-03-26 DIAGNOSIS — Z3A.01 LESS THAN 8 WEEKS GESTATION OF PREGNANCY: Primary | ICD-10-CM

## 2019-03-26 PROCEDURE — G8428 CUR MEDS NOT DOCUMENT: HCPCS | Performed by: FAMILY MEDICINE

## 2019-03-26 PROCEDURE — 1036F TOBACCO NON-USER: CPT | Performed by: FAMILY MEDICINE

## 2019-03-26 PROCEDURE — G8482 FLU IMMUNIZE ORDER/ADMIN: HCPCS | Performed by: FAMILY MEDICINE

## 2019-03-26 PROCEDURE — G8419 CALC BMI OUT NRM PARAM NOF/U: HCPCS | Performed by: FAMILY MEDICINE

## 2019-03-26 PROCEDURE — 76830 TRANSVAGINAL US NON-OB: CPT | Performed by: FAMILY MEDICINE

## 2019-03-26 PROCEDURE — 99213 OFFICE O/P EST LOW 20 MIN: CPT | Performed by: FAMILY MEDICINE

## 2019-03-26 RX ORDER — LANOLIN ALCOHOL/MO/W.PET/CERES
0.4 CREAM (GRAM) TOPICAL DAILY
Qty: 30 TABLET | Refills: 2 | Status: SHIPPED | OUTPATIENT
Start: 2019-03-26 | End: 2019-04-05

## 2019-03-26 RX ORDER — PNV NO.95/FERROUS FUM/FOLIC AC 28MG-0.8MG
1 TABLET ORAL DAILY
Qty: 30 TABLET | Refills: 2 | Status: SHIPPED | OUTPATIENT
Start: 2019-03-26 | End: 2019-08-09

## 2019-03-26 ASSESSMENT — PATIENT HEALTH QUESTIONNAIRE - PHQ9
SUM OF ALL RESPONSES TO PHQ QUESTIONS 1-9: 0
1. LITTLE INTEREST OR PLEASURE IN DOING THINGS: 0
2. FEELING DOWN, DEPRESSED OR HOPELESS: 0
SUM OF ALL RESPONSES TO PHQ9 QUESTIONS 1 & 2: 0
SUM OF ALL RESPONSES TO PHQ QUESTIONS 1-9: 0

## 2019-03-26 NOTE — PROGRESS NOTES
Patient seen in the office today for transvaginal US. Pregnancy noted on US. Patient will be seeing me for her pregnancy. Initial pre- visit scheduled for Friday (19). Folic acid and prenatal vitamins ordered and sent to patient's pharmacy today. Instructed patient to stop ranitidine, OCP, and Lexapro. Instructed her to finish Lexapro 20 mg daily this week and start Lexapro 10 mg daily next week and then wean to 0 mg daily the following week. Patient understood.      Rolo Leonard,   3/26/19  5:48 PM

## 2019-04-05 ENCOUNTER — ROUTINE PRENATAL (OUTPATIENT)
Dept: FAMILY MEDICINE CLINIC | Age: 29
End: 2019-04-05
Payer: COMMERCIAL

## 2019-04-05 ENCOUNTER — HOSPITAL ENCOUNTER (OUTPATIENT)
Age: 29
Discharge: HOME | End: 2019-04-07
Payer: COMMERCIAL

## 2019-04-05 VITALS
DIASTOLIC BLOOD PRESSURE: 64 MMHG | HEIGHT: 66 IN | BODY MASS INDEX: 17.19 KG/M2 | SYSTOLIC BLOOD PRESSURE: 97 MMHG | WEIGHT: 107 LBS | HEART RATE: 77 BPM | RESPIRATION RATE: 14 BRPM | OXYGEN SATURATION: 98 %

## 2019-04-05 DIAGNOSIS — O26.899 PREGNANCY RELATED NAUSEA, ANTEPARTUM: ICD-10-CM

## 2019-04-05 DIAGNOSIS — Z3A.01 LESS THAN 8 WEEKS GESTATION OF PREGNANCY: Primary | ICD-10-CM

## 2019-04-05 DIAGNOSIS — O26.891 VAGINAL DISCHARGE DURING PREGNANCY IN FIRST TRIMESTER: ICD-10-CM

## 2019-04-05 DIAGNOSIS — N89.8 VAGINAL DISCHARGE DURING PREGNANCY IN FIRST TRIMESTER: ICD-10-CM

## 2019-04-05 DIAGNOSIS — J06.9 VIRAL URI: ICD-10-CM

## 2019-04-05 DIAGNOSIS — Z3A.01 LESS THAN 8 WEEKS GESTATION OF PREGNANCY: ICD-10-CM

## 2019-04-05 DIAGNOSIS — R11.0 PREGNANCY RELATED NAUSEA, ANTEPARTUM: ICD-10-CM

## 2019-04-05 LAB
AMPHETAMINE SCREEN, URINE: NOT DETECTED
BACTERIA: ABNORMAL /HPF
BARBITURATE SCREEN URINE: NOT DETECTED
BENZODIAZEPINE SCREEN, URINE: NOT DETECTED
BILIRUBIN URINE: NEGATIVE
BILIRUBIN, POC: NEGATIVE
BLOOD URINE, POC: NORMAL
BLOOD, URINE: NEGATIVE
CANNABINOID SCREEN URINE: NOT DETECTED
CLARITY, POC: CLEAR
CLARITY: CLEAR
COCAINE METABOLITE SCREEN URINE: NOT DETECTED
COLOR, POC: YELLOW
COLOR: YELLOW
EPITHELIAL CELLS, UA: ABNORMAL /HPF
GLUCOSE URINE, POC: NEGATIVE
GLUCOSE URINE: NEGATIVE MG/DL
KETONES, POC: NEGATIVE
KETONES, URINE: NEGATIVE MG/DL
LEUKOCYTE EST, POC: NEGATIVE
LEUKOCYTE ESTERASE, URINE: ABNORMAL
METHADONE SCREEN, URINE: NOT DETECTED
NITRITE, POC: NEGATIVE
NITRITE, URINE: NEGATIVE
OPIATE SCREEN URINE: NOT DETECTED
PH UA: 5.5 (ref 5–9)
PH, POC: 6.5
PHENCYCLIDINE SCREEN URINE: NOT DETECTED
PROPOXYPHENE SCREEN: NOT DETECTED
PROTEIN UA: NEGATIVE MG/DL
PROTEIN, POC: NEGATIVE
RBC UA: ABNORMAL /HPF (ref 0–2)
SPECIFIC GRAVITY UA: 1.01 (ref 1–1.03)
SPECIFIC GRAVITY, POC: 1.02
UROBILINOGEN, POC: 1
UROBILINOGEN, URINE: 0.2 E.U./DL
WBC UA: ABNORMAL /HPF (ref 0–5)

## 2019-04-05 PROCEDURE — 81002 URINALYSIS NONAUTO W/O SCOPE: CPT | Performed by: FAMILY MEDICINE

## 2019-04-05 PROCEDURE — 81003 URINALYSIS AUTO W/O SCOPE: CPT | Performed by: FAMILY MEDICINE

## 2019-04-05 PROCEDURE — 0500F INITIAL PRENATAL CARE VISIT: CPT | Performed by: FAMILY MEDICINE

## 2019-04-05 PROCEDURE — 81001 URINALYSIS AUTO W/SCOPE: CPT

## 2019-04-05 PROCEDURE — 87591 N.GONORRHOEAE DNA AMP PROB: CPT

## 2019-04-05 PROCEDURE — 87088 URINE BACTERIA CULTURE: CPT

## 2019-04-05 PROCEDURE — 87491 CHLMYD TRACH DNA AMP PROBE: CPT

## 2019-04-05 PROCEDURE — 80307 DRUG TEST PRSMV CHEM ANLYZR: CPT

## 2019-04-05 RX ORDER — PYRIDOXINE HCL (VITAMIN B6) 25 MG
25 TABLET ORAL 3 TIMES DAILY PRN
Qty: 30 TABLET | Refills: 3 | Status: SHIPPED | OUTPATIENT
Start: 2019-04-05 | End: 2019-08-09

## 2019-04-05 ASSESSMENT — ENCOUNTER SYMPTOMS
NAUSEA: 1
CONSTIPATION: 0
WHEEZING: 0
EYE PAIN: 0
COUGH: 1
RHINORRHEA: 1
ABDOMINAL PAIN: 1
SINUS PRESSURE: 0
SINUS PAIN: 0
VOMITING: 0
PHOTOPHOBIA: 0
SHORTNESS OF BREATH: 0
SORE THROAT: 0
DIARRHEA: 0

## 2019-04-05 NOTE — PROGRESS NOTES
OBSTETRIC INITIAL VISIT:    Tevin Looney is a  29 y.o. female. Patient's gestational age is 5 weeks based on transvaginal US from 3/26/19 and 6 weeks based on FDLMP (19). OB history:  - Previous pregnancies: 2   - Date/Outcome/Weights:   1st pregnancy: Induced at 37 weeks for fetal distress, forceps-assisted vaginal delivery, no complications after delivery. 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 2nd pregnancy: 37 weeks. . No complications. Infection risk factors:   - AIDS, Hep B, TB, genital herpes risk: Monogamous relationship. No hx of illicit drug use. - Rash or viral illness since LMP: No  - Hx of sexually transmitted infections: No    Genetic risk factors for patient and FOB: No    Substance use risk factors: No    Current signs/symptoms:   - Nausea for about 1 week  - Congestion and productive cough, \"off-and-on\" for 1 week. No fevers or chills. Children have been sick recently from attending . No chest pain when coughing. Medications include: Prenatal vitamins    Tobacco use? No    Patient education:  - substance use avoidance  - genetic risks  - Toxoplasma precautions. No cats at home. - premature labor signs/symptoms  - labor precautions   - numbers to call with worrisome signs/symptoms  - childbirth classes. Will discuss more in detail as pregnancy progresses. Review of Systems   Constitutional: Positive for appetite change (more hungry) and fatigue (very sleepy ). Negative for chills and fever. HENT: Positive for congestion and rhinorrhea. Negative for ear pain, hearing loss, mouth sores, nosebleeds, sinus pressure, sinus pain, sore throat and tinnitus. Eyes: Negative for photophobia and pain. Denies blurry vision and double vision. Respiratory: Positive for cough (productive with yellow sputum). Negative for shortness of breath and wheezing. Cardiovascular: Negative for chest pain, palpitations and leg swelling.    Gastrointestinal: Positive for abdominal pain and nausea. Negative for constipation, diarrhea and vomiting. Genitourinary: Positive for frequency and vaginal discharge (started today). Negative for dysuria, hematuria, urgency and vaginal bleeding. Musculoskeletal: Positive for myalgias (legs). Neurological: Negative for dizziness, seizures, weakness, light-headedness and headaches. Hematological: Negative for adenopathy. Does not bruise/bleed easily. Psychiatric/Behavioral: Negative for dysphoric mood and suicidal ideas. Admits to irritability. Denies homicidal ideation. Physical Exam   Constitutional: She appears well-developed. She is active and cooperative. No distress. Genitourinary: Pelvic exam was performed with patient supine. There is no rash, lesion or Bartholin's cyst on the right labia. There is no rash, lesion or Bartholin's cyst on the left labia. Vagina exhibits no lesion. No erythema or bleeding in the vagina. No vaginal discharge found. Cervix exhibits discharge (white) and pinkness. Cervix does not exhibit lesion, polyp or eversion. HENT:   Head: Normocephalic and atraumatic. Right Ear: Tympanic membrane, external ear and ear canal normal. Tympanic membrane is not erythematous and not bulging. Left Ear: Tympanic membrane, external ear and ear canal normal. Tympanic membrane is not erythematous and not bulging. Nose: Mucosal edema and rhinorrhea present. No epistaxis. Right sinus exhibits no maxillary sinus tenderness and no frontal sinus tenderness. Left sinus exhibits no maxillary sinus tenderness and no frontal sinus tenderness. Mouth/Throat: Uvula is midline. No oropharyngeal exudate or posterior oropharyngeal erythema. Cardiovascular: Normal rate, regular rhythm, S1 normal and S2 normal.   No murmur heard. Pulmonary/Chest: Breath sounds normal. No respiratory distress. She has no decreased breath sounds. She has no wheezes. She has no rhonchi. She has no rales. Jeanie Contreras, DO   PGY-1  3:07 PM   4/5/19

## 2019-04-05 NOTE — PATIENT INSTRUCTIONS
Patient Education        Weeks 6 to 10 of Your Pregnancy: Care Instructions  Your Care Instructions    Congratulations on your pregnancy. This is an exciting and important time for you. During the first 6 to 10 weeks of your pregnancy, your body goes through many changes. Your baby grows very fast, even though you cannot feel it yet. You may start to notice that you feel different, both in your body and your emotions. Because each woman's pregnancy is unique, there is no right way to feel. You may feel the healthiest you have ever been, or you may feel tired or sick to your stomach (\"morning sickness\"). These early weeks are a time to make healthy choices and to eat the best foods for you and your baby. This care sheet will give you some ideas. This is also a good time to think about birth defects testing. These are tests done during pregnancy to look for possible problems with the baby. First trimester tests for birth defects can be done between 8 and 17 weeks of pregnancy, depending on the test. Talk with your doctor about what kinds of tests are available. Follow-up care is a key part of your treatment and safety. Be sure to make and go to all appointments, and call your doctor if you are having problems. It's also a good idea to know your test results and keep a list of the medicines you take. How can you care for yourself at home? Eat well  · Eat at least 3 meals and 2 healthy snacks every day. Eat fresh, whole foods, including:  ? 7 or more servings of bread, tortillas, cereal, rice, pasta, or oatmeal.  ? 3 or more servings of vegetables, especially leafy green vegetables. ? 2 or more servings of fruits. ? 3 or more servings of milk, yogurt, or cheese. ? 2 or more servings of meat, turkey, chicken, fish, eggs, or dried beans. · Drink plenty of fluids, especially water. Avoid sodas and other sweetened drinks.   · Choose foods that have important vitamins for your baby, such as calcium, iron, and folate. ? Dairy products, tofu, canned fish with bones, almonds, broccoli, dark leafy greens, corn tortillas, and fortified orange juice are good sources of calcium. ? Beef, poultry, liver, spinach, lentils, dried beans, fortified cereals, and dried fruits are rich in iron. ? Dark leafy greens, broccoli, asparagus, liver, fortified cereals, orange juice, peanuts, and almonds are good sources of folate. · Avoid foods that could harm your baby. ? Do not eat raw or undercooked meat, chicken, or fish (such as sushi or raw oysters). ? Do not eat raw eggs or foods that contain raw eggs, such as Caesar dressing. ? Do not eat soft cheeses and unpasteurized dairy foods, such as Brie, feta, or blue cheese. ? Do not eat fish that contains a lot of mercury, such as shark, swordfish, tilefish, or cordell mackerel. Do not eat more than 6 ounces of tuna each week. ? Do not eat raw sprouts, especially alfalfa sprouts. ? Cut down on caffeine, such as coffee, tea, and cola. Protect yourself and your baby  · Do not touch alan litter or cat feces. They can cause an infection that could harm your baby. · High body temperature can be harmful to your baby. So if you want to use a sauna or hot tub, be sure to talk to your doctor about how to use it safely. [place] with morning sickness  · Sip small amounts of water, juices, or shakes. Try drinking between meals, not with meals. · Eat 5 or 6 small meals a day. Try dry toast or crackers when you first get up, and eat breakfast a little later. · Avoid spicy, greasy, and fatty foods. · When you feel sick, open your windows or go for a short walk to get fresh air. · Try nausea wristbands. These help some women. · Tell your doctor if you think your prenatal vitamins make you sick. Where can you learn more? Go to https://imelda.healthACCB [employer].. org and sign in to your [employer] account.  Enter G112 in the [employer] box to learn more about \"Weeks 6 to 10 of Your Pregnancy: Care Instructions. \"     If you do not have an account, please click on the \"Sign Up Now\" link. Current as of: September 5, 2018  Content Version: 11.9  © 5138-3354 [employer]. Care instructions adapted under license by [facility] ([facility]). If you have questions about a medical condition or this instruction, always ask your healthcare professional. [address] any warranty or liability for your use of this information.

## 2019-04-05 NOTE — PROGRESS NOTES
30 yo  here for initial prenatal - had ultrasound for evaluation of irregular bleeding, at that time was on SSRI, OCPs, was found to have IUP. Previously pregnancies reviewed. First pregnancy - induced at 37 weeks for fetal distress, forceps assisted vaginal delivery, had malrotation of intestines  Second delivery -  at 40 weeks without complications  SH - no tob, no etoh/ drugs  29 is LMP   No genetic risk factors  Also c/o congestion/ cough  Taking prenatal vitamin daily  No vaginal bleeding  Mild abdominal pain.    Note recent abnormal pap and colposcopy LGSIL  Exam as per resident note  BP 97/64   Pulse 77   Resp 14   Ht 5' 6\" (1.676 m)   Wt 107 lb (48.5 kg)   LMP 2019   SpO2 98%   BMI 17.27 kg/m²      Early pregnancy  Routine prenatal labs  H/o depression - off SSRI at this time, monitor closely  Counseled  F/u 1 month

## 2019-04-08 LAB — URINE CULTURE, ROUTINE: NORMAL

## 2019-04-09 LAB
CHLAMYDIA TRACHOMATIS AMPLIFIED DET: NORMAL
N GONORRHOEAE AMPLIFIED DET: NORMAL

## 2019-05-16 ENCOUNTER — TELEPHONE (OUTPATIENT)
Dept: FAMILY MEDICINE CLINIC | Age: 29
End: 2019-05-16

## 2019-05-16 NOTE — TELEPHONE ENCOUNTER
Please call patient and have her schedule her next routine prenatal visit ASAP. She canceled her second prenatal appointment earlier this month. Patient can be scheduled with either Dr. Precious Casanova or Dr. Lizet Lucas if no availability with me. Thank you.

## 2019-07-25 ENCOUNTER — TELEPHONE (OUTPATIENT)
Dept: FAMILY MEDICINE CLINIC | Age: 29
End: 2019-07-25

## 2019-08-09 ENCOUNTER — ROUTINE PRENATAL (OUTPATIENT)
Dept: OBGYN CLINIC | Age: 29
End: 2019-08-09
Payer: COMMERCIAL

## 2019-08-09 VITALS
DIASTOLIC BLOOD PRESSURE: 66 MMHG | WEIGHT: 117.9 LBS | HEART RATE: 93 BPM | BODY MASS INDEX: 19.03 KG/M2 | SYSTOLIC BLOOD PRESSURE: 110 MMHG

## 2019-08-09 DIAGNOSIS — O09.292 IUGR (INTRAUTERINE GROWTH RESTRICTION) IN PRIOR PREGNANCY, PREGNANT, SECOND TRIMESTER: ICD-10-CM

## 2019-08-09 DIAGNOSIS — D57.3 SICKLE CELL TRAIT (HCC): ICD-10-CM

## 2019-08-09 DIAGNOSIS — Z3A.25 25 WEEKS GESTATION OF PREGNANCY: ICD-10-CM

## 2019-08-09 DIAGNOSIS — O35.2XX0 HEREDITARY DISEASE IN FAMILY POSSIBLY AFFECTING FETUS, AFFECTING MANAGEMENT OF MOTHER IN PREGNANCY, SINGLE OR UNSPECIFIED FETUS: ICD-10-CM

## 2019-08-09 LAB
GLUCOSE URINE, POC: NEGATIVE
PROTEIN UA: NEGATIVE

## 2019-08-09 PROCEDURE — 81002 URINALYSIS NONAUTO W/O SCOPE: CPT | Performed by: OBSTETRICS & GYNECOLOGY

## 2019-08-09 PROCEDURE — 1036F TOBACCO NON-USER: CPT | Performed by: OBSTETRICS & GYNECOLOGY

## 2019-08-09 PROCEDURE — G8420 CALC BMI NORM PARAMETERS: HCPCS | Performed by: OBSTETRICS & GYNECOLOGY

## 2019-08-09 PROCEDURE — 99203 OFFICE O/P NEW LOW 30 MIN: CPT | Performed by: OBSTETRICS & GYNECOLOGY

## 2019-08-09 PROCEDURE — 99201 HC NEW PT, E/M LEVEL 1: CPT | Performed by: OBSTETRICS & GYNECOLOGY

## 2019-08-09 PROCEDURE — 76811 OB US DETAILED SNGL FETUS: CPT | Performed by: OBSTETRICS & GYNECOLOGY

## 2019-08-09 PROCEDURE — G8427 DOCREV CUR MEDS BY ELIG CLIN: HCPCS | Performed by: OBSTETRICS & GYNECOLOGY

## 2019-08-09 RX ORDER — ONDANSETRON 4 MG/1
TABLET, FILM COATED ORAL
Refills: 3 | COMMUNITY
Start: 2019-05-31 | End: 2019-08-09

## 2019-08-09 RX ORDER — ASCORBIC ACID, CHOLECALCIFEROL, .ALPHA.-TOCOPHEROL ACETATE, DL-, PYRIDOXINE HYDROCHLORIDE, FOLIC ACID, CYANOCOBALAMIN, BIOTIN, CALCIUM CARBONATE, FERROUS ASPARTO GLYCINATE, IRON, POTASSIUM IODIDE, MAGNESIUM OXIDE, DOCONEXENT AND LOWBUSH BLUEBERRY 60; 1000; 10; 26; 400; 13; 280; 80; 9; 9; 150; 25; 350; 25; 600 MG/1; [IU]/1; [IU]/1; MG/1; UG/1; UG/1; UG/1; MG/1; MG/1; MG/1; UG/1; MG/1; MG/1; MG/1; UG/1
CAPSULE, GELATIN COATED ORAL
Refills: 12 | COMMUNITY
Start: 2019-07-04

## 2019-08-09 NOTE — LETTER
Procedure Laterality Date    WISDOM TOOTH EXTRACTION       ALLERGIES: No Known Allergies    Current Outpatient Medications:     Odkpvw-QbFje-FsJah-Meth-FA-DHA (PRENATE MINI) 18-0.6-0.4-350 MG CAPS, TAKE ONE CAPSULE BY MOUTH EVERY DAY, Disp: , Rfl: 12    fluticasone (FLONASE) 50 MCG/ACT nasal spray, 1 spray by Nasal route daily, Disp: 1 Bottle, Rfl: 3    Social History     Tobacco Use    Smoking status: Never Smoker    Smokeless tobacco: Never Used   Substance Use Topics    Alcohol use: No     FAMILY MEDICAL HISTORY:   Negative for congenital abnormalities, autism, genetic disease and mental retardation, not listed above. Review of Systems :   CONSTITUTIONAL : No fever, no chills   HEENT : No headache, no visual changes, no rhinorrhea, no sore throat   CARDIOVASCULAR : No pain, no palpitations, no edema   RESPIRATORY : No pain, no shortness of breath   GASTROINTESTINAL : No N/V, no D/C, no abdominal pain   GENITOURINARY : No dysuria, hematuria and no incontinence   MUSCULOSKELETAL : No myalgia, No back pain  NEUROLOGICAL : No numbness, no tingling, no tremors. No history of seizures  ALL OTHER SYSTEMS WERE REPORTED AS NEGATIVE. PERTINENT PHYSICAL EXAMINATION:   /66   Pulse 93   Wt 117 lb 14.4 oz (53.5 kg)   LMP 02/13/2019   BMI 19.03 kg/m²   Urine dipstick:   Negative for Glucose    Negative for Albumin    GENERAL:   The patient is a well developed, female who is alert cooperative and oriented times three in no acute distress. HEENT:  Normo cephalic and atraumatic. No facial edema. ABDOMEN:   Her uterus is gravid. She had no complaint of abdominal pain or tenderness. The fetal heart rate is 153 bpm. The fetus is in the cephalic presentation which was confirmed by the ultrasound assessment. EXTREMITIES:  No peripheral edema is noted. A fetal ultrasound assessment was performed today. A report is enclosed for your review.     IMPRESSION:

## 2019-08-09 NOTE — PATIENT INSTRUCTIONS
Please arrive for your scheduled appointment at least 15 minutes early with your actual insurance card+ a photo ID. Also if you need any refills ordered or have questions, it may take up 48 hours to reply. Please allow ample time for your refills. Call me when you use last refill. Thank you for your cooperation. Call your primary obstetrician with bleeding, leaking of fluid, abdominal tenderness, headache, blurry vision, epigastric pain and increased urinary frequency. Any questions contact [facility] at [phone]. If you are experiencing an emergency and need immediate help, call 911 or go to go emergency room or labor and delivery. if you are sick, not feeling well or have an infectious process going on please reschedule your appointment by calling [phone]. Also if any family members are not feeling well, please do not bring them to your appointment. We appreciate your cooperation. We are doing this in order to protect our pregnant mothers+ their babies. Patient Education        Weeks 22 to 26 of Your Pregnancy: Care Instructions  Your Care Instructions    As you enter your 7th month of pregnancy at week 26, your baby's lungs are growing stronger and getting ready to breathe. You may notice that your baby responds to the sound of your or your partner's voice. You may also notice that your baby does less turning and twisting and more squirming or jerking. Jerking often means that your baby has the hiccups. Hiccups are perfectly normal and are only temporary. You may want to think about attending a childbirth preparation class. This is also a good time to start thinking about whether you want to have pain medicine during labor. Most pregnant women are tested for gestational diabetes between weeks 25 and 28. Gestational diabetes occurs when your blood sugar level gets too high when you're pregnant. The test is important, because you can have gestational diabetes and not know it.  But the condition can cause problems for your baby. Follow-up care is a key part of your treatment and safety. Be sure to make and go to all appointments, and call your doctor if you are having problems. It's also a good idea to know your test results and keep a list of the medicines you take. How can you care for yourself at home? Ease discomfort from your baby's kicking  · Change your position. Sometimes this will cause your baby to change position too. · Take a deep breath while you raise your arm over your head. Then breathe out while you drop your arm. Do Kegel exercises to prevent urine from leaking  · You can do Kegel exercises while you stand or sit. ? Squeeze the same muscles you would use to stop your urine. Your belly and thighs should not move. ? Hold the squeeze for 3 seconds, and then relax for 3 seconds. ? Start with 3 seconds. Then add 1 second each week until you are able to squeeze for 10 seconds. ? Repeat the exercise 10 to 15 times for each session. Do three or more sessions each day. Ease or reduce swelling in your feet, ankles, hands, and fingers  · If your fingers are puffy, take off your rings. · Do not eat high-salt foods, such as potato chips. · Prop up your feet on a stool or couch as much as possible. Sleep with pillows under your feet. · Do not stand for long periods of time or wear tight shoes. · Wear support stockings. Where can you learn more? Go to https://Knack.it.Ahonya. org and sign in to your TableNOW account. Enter G264 in the [facility] box to learn more about \"Weeks 22 to 26 of Your Pregnancy: Care Instructions. \"     If you do not have an account, please click on the \"Sign Up Now\" link. Current as of: September 5, 2018  Content Version: 12.1  © 5443-3872 Healthwise, Incorporated. Care instructions adapted under license by [facility] ([facility]).  If you have questions about a medical condition or this instruction, always ask your healthcare professional. Zenaida Go,

## 2019-09-18 ENCOUNTER — ROUTINE PRENATAL (OUTPATIENT)
Dept: OBGYN CLINIC | Age: 29
End: 2019-09-18
Payer: COMMERCIAL

## 2019-09-18 ENCOUNTER — HOSPITAL ENCOUNTER (OUTPATIENT)
Age: 29
Discharge: HOME | End: 2019-09-18
Attending: OBSTETRICS & GYNECOLOGY | Admitting: OBSTETRICS & GYNECOLOGY
Payer: COMMERCIAL

## 2019-09-18 VITALS
TEMPERATURE: 97.9 F | DIASTOLIC BLOOD PRESSURE: 55 MMHG | SYSTOLIC BLOOD PRESSURE: 116 MMHG | RESPIRATION RATE: 16 BRPM | HEART RATE: 84 BPM

## 2019-09-18 VITALS
BODY MASS INDEX: 20.09 KG/M2 | SYSTOLIC BLOOD PRESSURE: 112 MMHG | WEIGHT: 124.5 LBS | DIASTOLIC BLOOD PRESSURE: 71 MMHG | HEART RATE: 83 BPM

## 2019-09-18 DIAGNOSIS — D57.3 SICKLE CELL TRAIT (HCC): ICD-10-CM

## 2019-09-18 DIAGNOSIS — O35.2XX0 HEREDITARY DISEASE IN FAMILY POSSIBLY AFFECTING FETUS, AFFECTING MANAGEMENT OF MOTHER IN PREGNANCY, SINGLE OR UNSPECIFIED FETUS: Primary | ICD-10-CM

## 2019-09-18 DIAGNOSIS — R94.8 ABNORMAL PLACENTA FUNCTION TEST: ICD-10-CM

## 2019-09-18 DIAGNOSIS — O47.03 PREMATURE UTERINE CONTRACTIONS CAUSING THREATENED PREMATURE LABOR IN THIRD TRIMESTER: ICD-10-CM

## 2019-09-18 DIAGNOSIS — O26.873 SHORT CERVIX DURING PREGNANCY IN THIRD TRIMESTER: ICD-10-CM

## 2019-09-18 DIAGNOSIS — Z3A.31 31 WEEKS GESTATION OF PREGNANCY: ICD-10-CM

## 2019-09-18 DIAGNOSIS — O36.8390 VARIABLE FETAL HEART RATE DECELERATIONS, ANTEPARTUM: ICD-10-CM

## 2019-09-18 LAB
AMNISURE, POC: NEGATIVE
Lab: NORMAL
NEGATIVE QC PASS/FAIL: NORMAL
POSITIVE QC PASS/FAIL: NORMAL

## 2019-09-18 PROCEDURE — 99214 OFFICE O/P EST MOD 30 MIN: CPT | Performed by: OBSTETRICS & GYNECOLOGY

## 2019-09-18 PROCEDURE — 6360000002 HC RX W HCPCS: Performed by: OBSTETRICS & GYNECOLOGY

## 2019-09-18 PROCEDURE — 81002 URINALYSIS NONAUTO W/O SCOPE: CPT | Performed by: OBSTETRICS & GYNECOLOGY

## 2019-09-18 PROCEDURE — 76817 TRANSVAGINAL US OBSTETRIC: CPT | Performed by: OBSTETRICS & GYNECOLOGY

## 2019-09-18 PROCEDURE — 96372 THER/PROPH/DIAG INJ SC/IM: CPT

## 2019-09-18 PROCEDURE — G8427 DOCREV CUR MEDS BY ELIG CLIN: HCPCS | Performed by: OBSTETRICS & GYNECOLOGY

## 2019-09-18 PROCEDURE — 59025 FETAL NON-STRESS TEST: CPT

## 2019-09-18 PROCEDURE — 76805 OB US >/= 14 WKS SNGL FETUS: CPT | Performed by: OBSTETRICS & GYNECOLOGY

## 2019-09-18 PROCEDURE — G8420 CALC BMI NORM PARAMETERS: HCPCS | Performed by: OBSTETRICS & GYNECOLOGY

## 2019-09-18 PROCEDURE — 1036F TOBACCO NON-USER: CPT | Performed by: OBSTETRICS & GYNECOLOGY

## 2019-09-18 PROCEDURE — 76820 UMBILICAL ARTERY ECHO: CPT | Performed by: OBSTETRICS & GYNECOLOGY

## 2019-09-18 PROCEDURE — 76818 FETAL BIOPHYS PROFILE W/NST: CPT | Performed by: OBSTETRICS & GYNECOLOGY

## 2019-09-18 PROCEDURE — 99211 OFF/OP EST MAY X REQ PHY/QHP: CPT | Performed by: OBSTETRICS & GYNECOLOGY

## 2019-09-18 PROCEDURE — 99211 OFF/OP EST MAY X REQ PHY/QHP: CPT

## 2019-09-18 RX ORDER — BETAMETHASONE SODIUM PHOSPHATE AND BETAMETHASONE ACETATE 3; 3 MG/ML; MG/ML
12 INJECTION, SUSPENSION INTRA-ARTICULAR; INTRALESIONAL; INTRAMUSCULAR; SOFT TISSUE ONCE
Status: COMPLETED | OUTPATIENT
Start: 2019-09-18 | End: 2019-09-18

## 2019-09-18 RX ADMIN — BETAMETHASONE SODIUM PHOSPHATE AND BETAMETHASONE ACETATE 12 MG: 3; 3 INJECTION, SUSPENSION INTRA-ARTICULAR; INTRALESIONAL; INTRAMUSCULAR at 19:56

## 2019-09-18 NOTE — LETTER
[facility]aternal Fetal [facility]  [address] [place] [address], [address] [place] [address]  [address] [address]  Phone: [phone]  Fax: [phone]    Ernie Correia MD        September 18, 2019     Patient: Donnell Strickland   YOB: 1990   Date of Visit: 9/18/2019       To Whom it May Concern:    Buffy Schmitt was seen in my clinic on 9/18/2019. If you have any questions or concerns, please don't hesitate to call.     Sincerely,         Ernie Correia MD

## 2019-09-18 NOTE — PATIENT INSTRUCTIONS
Doctor During Pregnancy (After 20 Weeks)  Your Care Instructions  It's common to have concerns about what might be a problem during pregnancy. Although most pregnant women don't have any serious problems, it's important to know when to call your doctor if you have certain symptoms or signs of labor. These are general suggestions. Your doctor may give you some more information about when to call. When to call your doctor (after 20 weeks)  [id number] anytime you think you may need emergency care. For example, call if:  · You have severe vaginal bleeding. · You have sudden, severe pain in your belly. · You passed out (lost consciousness). · You have a seizure. · You see or feel the umbilical cord. · You think you are about to deliver your baby and can't make it safely to the hospital.  Call your doctor now or seek immediate medical care if:  · You have vaginal bleeding. · You have belly pain. · You have a fever. · You have symptoms of preeclampsia, such as:  ? Sudden swelling of your face, hands, or feet. ? New vision problems (such as dimness, blurring, or seeing spots). ? A severe headache. · You have a sudden release of fluid from your vagina. (You think your water broke.)  · You think that you may be in labor. This means that you've had at least 6 contractions in an hour. · You notice that your baby has stopped moving or is moving much less than normal.  · You have symptoms of a urinary tract infection. These may include:  ? Pain or burning when you urinate. ? A frequent need to urinate without being able to pass much urine. ? Pain in the flank, which is just below the rib cage and above the waist on either side of the back. ? Blood in your urine. Watch closely for changes in your health, and be sure to contact your doctor if:  · You have vaginal discharge that smells bad. · You have skin changes, such as:  ? A rash. ? Itching. ? Yellow color to your skin.   · You have other concerns about your pregnancy. If you have labor signs at 37 weeks or more  If you have signs of labor at 37 weeks or more, your doctor may tell you to call when your labor becomes more active. Symptoms of active labor include:  · Contractions that are regular. · Contractions that are less than 5 minutes apart. · Contractions that are hard to talk through. Follow-up care is a key part of your treatment and safety. Be sure to make and go to all appointments, and call your doctor if you are having problems. It's also a good idea to know your test results and keep a list of the medicines you take. Where can you learn more? Go to https://chpepiceweb.Quantum Voyage. org and sign in to your BioSilta account. Enter  in the Y[facility]da box to learn more about \"Learning About When to Call Your Doctor During Pregnancy (After 20 Weeks). \"     If you do not have an account, please click on the \"Sign Up Now\" link. Current as of: September 5, 2018  Content Version: 12.1  © 0311-9571 Healthwise, [place]. Care instructions adapted under license by [facility] ([facility]). If you have questions about a medical condition or this instruction, always ask your healthcare professional. [place] [address] any warranty or liability for your use of this information. Patient Education        Counting Your Baby's Kicks: Care Instructions  Your Care Instructions    Counting your baby's kicks is one way your doctor can tell that your baby is healthy. Most women--especially in a first pregnancy--feel their baby move for the first time between 16 and 22 weeks. The movement may feel like flutters rather than kicks. Your baby may move more at certain times of the day. When you are active, you may notice less kicking than when you are resting. At your prenatal visits, your doctor will ask whether the baby is active. In your last trimester, your doctor may ask you to count the number of times you feel your baby move.   Follow-up

## 2019-09-18 NOTE — LETTER
Negative for cervical LEEP / conization /cryosurgery. Negative for uterine surgery. Negative for ovarian or tubal surgery.      Diagnosis Date    Abnormal Pap smear of cervix      Anemia      Depression      Disease of blood and blood forming organ       Sickle cell trait    Postpartum depression      Seasonal allergies      Seizures (HCC)       as child    Sickle cell trait ([facility] [address]           Past Surgical History:   Procedure Laterality Date    WISDOM TOOTH EXTRACTION          ALLERGIES: No Known Allergies     Current Outpatient Medications:     Cmnggl-PaBjk-UtLvx-Meth-FA-DHA (PRENATE MINI) take 1 daily.   fluticasone (FLONASE) 50 MCG/ACT nasal spray, 1 spray by Nasal route daily     Social History      Tobacco Use    Smoking status: Never Smoker    Smokeless tobacco: Never Used   Substance Use Topics    Alcohol use: No      FAMILY MEDICAL HISTORY:   Negative for congenital abnormalities, autism, genetic disease and mental retardation, not listed above.      Review of Systems :   CONSTITUTIONAL : No fever, no chills   HEENT : No headache, no visual changes, no rhinorrhea, no sore throat   CARDIOVASCULAR : No pain, no palpitations, no edema   RESPIRATORY : No pain, no shortness of breath   GASTROINTESTINAL : No N/V, no D/C, no abdominal pain   GENITOURINARY : No dysuria, hematuria and no incontinence   MUSCULOSKELETAL : No myalgia, No back pain  NEUROLOGICAL : No numbness, no tingling, no tremors. No history of seizures  ALL OTHER SYSTEMS WERE REPORTED AS NEGATIVE.     PERTINENT PHYSICAL EXAMINATION:   /71   Pulse 83   Wt 124 lb 8 oz (56.5 kg)   LMP 02/13/2019   BMI 20.09 kg/m²   Urine dipstick:   Negative for Glucose    Negative for Albumin     GENERAL:   The patient is a well developed, female who is alert cooperative and oriented times three in no acute distress.     HEENT:  Normo cephalic and atraumatic.  No facial edema.      ABDOMEN: greater than 50% of the time was to discuss complications and problems related to her pregnancy. This includes my telephone conversation with Dr. Festus Markham regarding the findings on the ultrasound assessment today, the patient's symptomatology, the results of her fetal testing, and my recommendations for management. I answered all of her questions to her satisfaction.  I asked her to call if she had any additional questions prior to her next visit.       If you have any questions regarding her management, please contact me at your convenience and thank you for allowing me to participate in her care.     Sincerely,           Leona Kelley MD, [address], [address], [address] [facility],  [facility], [facility]  Director [address] [place] [address]  [phone]

## 2019-09-18 NOTE — PROGRESS NOTES
Nst completed. Baseline 130 with moderate variabilty+ accelelrations. Occasional ctx noted.  Reactive per dr Lizabeth Baca

## 2019-09-19 LAB
GLUCOSE URINE, POC: NORMAL
PROTEIN UA: NEGATIVE

## 2019-09-19 NOTE — PROGRESS NOTES
Dr. Justina Ozuna requested NST for contractions. BP (!) 116/55   Pulse 84   Temp 97.9 °F (36.6 °C) (Oral)   Resp 16   LMP 02/13/2019   Fetal heart rate:  Baseline Heart Rate 140, accelerations: present  decelerations: absent  Contraction frequency:  0 minutes  Membranes:  Intact    Dr. Justina Ozuna notified.     Plan:   Per dr. Justina Ozuna orders    Shital Adjutant

## 2019-10-04 ENCOUNTER — HOSPITAL ENCOUNTER (OUTPATIENT)
Age: 29
Setting detail: OBSERVATION
Discharge: HOME | End: 2019-10-05
Attending: FAMILY MEDICINE | Admitting: FAMILY MEDICINE
Payer: COMMERCIAL

## 2019-10-04 PROBLEM — O36.8390 FETAL HEART RATE DECELERATIONS AFFECTING MANAGEMENT OF MOTHER: Status: ACTIVE | Noted: 2019-10-04

## 2019-10-04 PROBLEM — O36.8390 VARIABLE FETAL HEART RATE DECELERATIONS, ANTEPARTUM: Status: RESOLVED | Noted: 2019-09-18 | Resolved: 2019-10-04

## 2019-10-04 PROBLEM — O09.292 IUGR (INTRAUTERINE GROWTH RESTRICTION) IN PRIOR PREGNANCY, PREGNANT, SECOND TRIMESTER: Status: RESOLVED | Noted: 2019-08-09 | Resolved: 2019-10-04

## 2019-10-04 PROBLEM — Z3A.31 31 WEEKS GESTATION OF PREGNANCY: Status: RESOLVED | Noted: 2019-09-18 | Resolved: 2019-10-04

## 2019-10-04 PROBLEM — O34.33 PREMATURE CERVICAL DILATION IN THIRD TRIMESTER: Status: ACTIVE | Noted: 2019-10-04

## 2019-10-04 PROBLEM — R94.8 ABNORMAL PLACENTA FUNCTION TEST: Status: RESOLVED | Noted: 2019-09-18 | Resolved: 2019-10-04

## 2019-10-04 PROBLEM — O47.03 PREMATURE UTERINE CONTRACTIONS CAUSING THREATENED PREMATURE LABOR IN THIRD TRIMESTER: Status: RESOLVED | Noted: 2019-09-18 | Resolved: 2019-10-04

## 2019-10-04 PROBLEM — Z64.1 MULTIPARITY: Status: ACTIVE | Noted: 2019-10-04

## 2019-10-04 LAB
ABO/RH: NORMAL
ANTIBODY SCREEN: NORMAL
BACTERIA: ABNORMAL /HPF
BILIRUBIN URINE: NEGATIVE
BLOOD, URINE: NEGATIVE
CLARITY: CLEAR
COLOR: YELLOW
EPITHELIAL CELLS, UA: ABNORMAL /HPF
GLUCOSE URINE: NEGATIVE MG/DL
HCT VFR BLD CALC: 31.8 % (ref 34–48)
HEMOGLOBIN: 11.1 G/DL (ref 11.5–15.5)
KETONES, URINE: NEGATIVE MG/DL
LEUKOCYTE ESTERASE, URINE: ABNORMAL
MCH RBC QN AUTO: 31.5 PG (ref 26–35)
MCHC RBC AUTO-ENTMCNC: 34.9 % (ref 32–34.5)
MCV RBC AUTO: 90.3 FL (ref 80–99.9)
NITRITE, URINE: NEGATIVE
PDW BLD-RTO: 13.5 FL (ref 11.5–15)
PH UA: 7 (ref 5–9)
PLATELET # BLD: 204 E9/L (ref 130–450)
PMV BLD AUTO: 11.8 FL (ref 7–12)
PROTEIN UA: NEGATIVE MG/DL
RBC # BLD: 3.52 E12/L (ref 3.5–5.5)
RBC UA: ABNORMAL /HPF (ref 0–2)
SPECIFIC GRAVITY UA: 1.01 (ref 1–1.03)
UROBILINOGEN, URINE: 0.2 E.U./DL
WBC # BLD: 9.7 E9/L (ref 4.5–11.5)
WBC UA: ABNORMAL /HPF (ref 0–5)

## 2019-10-04 PROCEDURE — 76816 OB US FOLLOW-UP PER FETUS: CPT

## 2019-10-04 PROCEDURE — 96372 THER/PROPH/DIAG INJ SC/IM: CPT

## 2019-10-04 PROCEDURE — 85027 COMPLETE CBC AUTOMATED: CPT

## 2019-10-04 PROCEDURE — 36415 COLL VENOUS BLD VENIPUNCTURE: CPT

## 2019-10-04 PROCEDURE — 81001 URINALYSIS AUTO W/SCOPE: CPT

## 2019-10-04 PROCEDURE — 99243 OFF/OP CNSLTJ NEW/EST LOW 30: CPT | Performed by: OBSTETRICS & GYNECOLOGY

## 2019-10-04 PROCEDURE — 76820 UMBILICAL ARTERY ECHO: CPT | Performed by: OBSTETRICS & GYNECOLOGY

## 2019-10-04 PROCEDURE — 86900 BLOOD TYPING SEROLOGIC ABO: CPT

## 2019-10-04 PROCEDURE — 76819 FETAL BIOPHYS PROFIL W/O NST: CPT | Performed by: OBSTETRICS & GYNECOLOGY

## 2019-10-04 PROCEDURE — 86901 BLOOD TYPING SEROLOGIC RH(D): CPT

## 2019-10-04 PROCEDURE — 76817 TRANSVAGINAL US OBSTETRIC: CPT | Performed by: OBSTETRICS & GYNECOLOGY

## 2019-10-04 PROCEDURE — 76820 UMBILICAL ARTERY ECHO: CPT

## 2019-10-04 PROCEDURE — 76819 FETAL BIOPHYS PROFIL W/O NST: CPT

## 2019-10-04 PROCEDURE — G0378 HOSPITAL OBSERVATION PER HR: HCPCS

## 2019-10-04 PROCEDURE — 86850 RBC ANTIBODY SCREEN: CPT

## 2019-10-04 PROCEDURE — 6360000002 HC RX W HCPCS: Performed by: OBSTETRICS & GYNECOLOGY

## 2019-10-04 PROCEDURE — 76817 TRANSVAGINAL US OBSTETRIC: CPT

## 2019-10-04 PROCEDURE — 76816 OB US FOLLOW-UP PER FETUS: CPT | Performed by: OBSTETRICS & GYNECOLOGY

## 2019-10-04 RX ORDER — BETAMETHASONE SODIUM PHOSPHATE AND BETAMETHASONE ACETATE 3; 3 MG/ML; MG/ML
12 INJECTION, SUSPENSION INTRA-ARTICULAR; INTRALESIONAL; INTRAMUSCULAR; SOFT TISSUE ONCE
Status: COMPLETED | OUTPATIENT
Start: 2019-10-04 | End: 2019-10-04

## 2019-10-04 RX ORDER — SODIUM CHLORIDE, SODIUM LACTATE, POTASSIUM CHLORIDE, CALCIUM CHLORIDE 600; 310; 30; 20 MG/100ML; MG/100ML; MG/100ML; MG/100ML
INJECTION, SOLUTION INTRAVENOUS CONTINUOUS
Status: DISCONTINUED | OUTPATIENT
Start: 2019-10-04 | End: 2019-10-05 | Stop reason: HOSPADM

## 2019-10-04 RX ADMIN — BETAMETHASONE SODIUM PHOSPHATE AND BETAMETHASONE ACETATE 12 MG: 3; 3 INJECTION, SUSPENSION INTRA-ARTICULAR; INTRALESIONAL; INTRAMUSCULAR at 20:45

## 2019-10-05 VITALS
RESPIRATION RATE: 18 BRPM | DIASTOLIC BLOOD PRESSURE: 57 MMHG | TEMPERATURE: 98.6 F | HEART RATE: 90 BPM | SYSTOLIC BLOOD PRESSURE: 116 MMHG

## 2019-10-05 PROCEDURE — 96361 HYDRATE IV INFUSION ADD-ON: CPT

## 2019-10-05 PROCEDURE — 36415 COLL VENOUS BLD VENIPUNCTURE: CPT

## 2019-10-05 PROCEDURE — 96372 THER/PROPH/DIAG INJ SC/IM: CPT

## 2019-10-05 PROCEDURE — G0378 HOSPITAL OBSERVATION PER HR: HCPCS

## 2019-10-05 PROCEDURE — 96360 HYDRATION IV INFUSION INIT: CPT

## 2019-10-05 PROCEDURE — 6360000002 HC RX W HCPCS: Performed by: OBSTETRICS & GYNECOLOGY

## 2019-10-05 PROCEDURE — 99224 PR SBSQ OBSERVATION CARE/DAY 15 MINUTES: CPT | Performed by: OBSTETRICS & GYNECOLOGY

## 2019-10-05 RX ORDER — BETAMETHASONE SODIUM PHOSPHATE AND BETAMETHASONE ACETATE 3; 3 MG/ML; MG/ML
12 INJECTION, SUSPENSION INTRA-ARTICULAR; INTRALESIONAL; INTRAMUSCULAR; SOFT TISSUE ONCE
Status: COMPLETED | OUTPATIENT
Start: 2019-10-05 | End: 2019-10-05

## 2019-10-05 RX ADMIN — BETAMETHASONE SODIUM PHOSPHATE AND BETAMETHASONE ACETATE 12 MG: 3; 3 INJECTION, SUSPENSION INTRA-ARTICULAR; INTRALESIONAL; INTRAMUSCULAR at 17:13

## 2019-10-11 ENCOUNTER — ROUTINE PRENATAL (OUTPATIENT)
Dept: OBGYN CLINIC | Age: 29
End: 2019-10-11
Payer: COMMERCIAL

## 2019-10-11 VITALS
HEART RATE: 105 BPM | DIASTOLIC BLOOD PRESSURE: 74 MMHG | WEIGHT: 125.38 LBS | SYSTOLIC BLOOD PRESSURE: 118 MMHG | BODY MASS INDEX: 20.24 KG/M2

## 2019-10-11 DIAGNOSIS — D57.3 SICKLE CELL TRAIT (HCC): ICD-10-CM

## 2019-10-11 DIAGNOSIS — O35.2XX0 HEREDITARY DISEASE IN FAMILY POSSIBLY AFFECTING FETUS, AFFECTING MANAGEMENT OF MOTHER IN PREGNANCY, SINGLE OR UNSPECIFIED FETUS: ICD-10-CM

## 2019-10-11 DIAGNOSIS — O36.8390 FETAL HEART RATE DECELERATIONS AFFECTING MANAGEMENT OF MOTHER: Primary | ICD-10-CM

## 2019-10-11 DIAGNOSIS — Z3A.34 34 WEEKS GESTATION OF PREGNANCY: ICD-10-CM

## 2019-10-11 LAB
GLUCOSE URINE, POC: NORMAL
PROTEIN UA: NEGATIVE

## 2019-10-11 PROCEDURE — 1036F TOBACCO NON-USER: CPT | Performed by: OBSTETRICS & GYNECOLOGY

## 2019-10-11 PROCEDURE — 81002 URINALYSIS NONAUTO W/O SCOPE: CPT | Performed by: OBSTETRICS & GYNECOLOGY

## 2019-10-11 PROCEDURE — G8420 CALC BMI NORM PARAMETERS: HCPCS | Performed by: OBSTETRICS & GYNECOLOGY

## 2019-10-11 PROCEDURE — G8484 FLU IMMUNIZE NO ADMIN: HCPCS | Performed by: OBSTETRICS & GYNECOLOGY

## 2019-10-11 PROCEDURE — 76818 FETAL BIOPHYS PROFILE W/NST: CPT | Performed by: OBSTETRICS & GYNECOLOGY

## 2019-10-11 PROCEDURE — 99211 OFF/OP EST MAY X REQ PHY/QHP: CPT | Performed by: OBSTETRICS & GYNECOLOGY

## 2019-10-11 PROCEDURE — 99213 OFFICE O/P EST LOW 20 MIN: CPT | Performed by: OBSTETRICS & GYNECOLOGY

## 2019-10-11 PROCEDURE — G8427 DOCREV CUR MEDS BY ELIG CLIN: HCPCS | Performed by: OBSTETRICS & GYNECOLOGY

## 2019-10-11 PROCEDURE — 76816 OB US FOLLOW-UP PER FETUS: CPT | Performed by: OBSTETRICS & GYNECOLOGY

## 2019-10-15 ENCOUNTER — HOSPITAL ENCOUNTER (INPATIENT)
Age: 29
LOS: 3 days | Discharge: HOME | DRG: 560 | End: 2019-10-18
Attending: FAMILY MEDICINE | Admitting: FAMILY MEDICINE
Payer: COMMERCIAL

## 2019-10-15 PROBLEM — O47.00 PRETERM CONTRACTIONS: Status: ACTIVE | Noted: 2019-10-15

## 2019-10-15 LAB
ABO/RH: NORMAL
AMPHETAMINE SCREEN, URINE: NOT DETECTED
ANTIBODY SCREEN: NORMAL
BACTERIA: ABNORMAL /HPF
BARBITURATE SCREEN URINE: NOT DETECTED
BENZODIAZEPINE SCREEN, URINE: NOT DETECTED
BILIRUBIN URINE: NEGATIVE
BLOOD, URINE: ABNORMAL
CANNABINOID SCREEN URINE: NOT DETECTED
CLARITY: CLEAR
COCAINE METABOLITE SCREEN URINE: NOT DETECTED
COLOR: YELLOW
EPITHELIAL CELLS, UA: ABNORMAL /HPF
GLUCOSE URINE: NEGATIVE MG/DL
HCT VFR BLD CALC: 32.2 % (ref 34–48)
HEMOGLOBIN: 11.1 G/DL (ref 11.5–15.5)
KETONES, URINE: NEGATIVE MG/DL
LEUKOCYTE ESTERASE, URINE: NEGATIVE
Lab: NORMAL
MCH RBC QN AUTO: 30.9 PG (ref 26–35)
MCHC RBC AUTO-ENTMCNC: 34.5 % (ref 32–34.5)
MCV RBC AUTO: 89.7 FL (ref 80–99.9)
METHADONE SCREEN, URINE: NOT DETECTED
NITRITE, URINE: NEGATIVE
OPIATE SCREEN URINE: NOT DETECTED
PDW BLD-RTO: 13.4 FL (ref 11.5–15)
PH UA: 7 (ref 5–9)
PHENCYCLIDINE SCREEN URINE: NOT DETECTED
PLATELET # BLD: 217 E9/L (ref 130–450)
PMV BLD AUTO: 11.9 FL (ref 7–12)
PROPOXYPHENE SCREEN: NOT DETECTED
PROTEIN UA: NEGATIVE MG/DL
RBC # BLD: 3.59 E12/L (ref 3.5–5.5)
RBC UA: ABNORMAL /HPF (ref 0–2)
SPECIFIC GRAVITY UA: <=1.005 (ref 1–1.03)
UROBILINOGEN, URINE: 0.2 E.U./DL
WBC # BLD: 9.3 E9/L (ref 4.5–11.5)
WBC UA: ABNORMAL /HPF (ref 0–5)

## 2019-10-15 PROCEDURE — 76819 FETAL BIOPHYS PROFIL W/O NST: CPT | Performed by: OBSTETRICS & GYNECOLOGY

## 2019-10-15 PROCEDURE — 86900 BLOOD TYPING SEROLOGIC ABO: CPT

## 2019-10-15 PROCEDURE — 86901 BLOOD TYPING SEROLOGIC RH(D): CPT

## 2019-10-15 PROCEDURE — 87088 URINE BACTERIA CULTURE: CPT

## 2019-10-15 PROCEDURE — 2580000003 HC RX 258: Performed by: FAMILY MEDICINE

## 2019-10-15 PROCEDURE — G0378 HOSPITAL OBSERVATION PER HR: HCPCS

## 2019-10-15 PROCEDURE — 80307 DRUG TEST PRSMV CHEM ANLYZR: CPT

## 2019-10-15 PROCEDURE — 76819 FETAL BIOPHYS PROFIL W/O NST: CPT

## 2019-10-15 PROCEDURE — 86850 RBC ANTIBODY SCREEN: CPT

## 2019-10-15 PROCEDURE — 85027 COMPLETE CBC AUTOMATED: CPT

## 2019-10-15 PROCEDURE — 36415 COLL VENOUS BLD VENIPUNCTURE: CPT

## 2019-10-15 PROCEDURE — 99252 IP/OBS CONSLTJ NEW/EST SF 35: CPT | Performed by: OBSTETRICS & GYNECOLOGY

## 2019-10-15 PROCEDURE — 99233 SBSQ HOSP IP/OBS HIGH 50: CPT | Performed by: MIDWIFE

## 2019-10-15 PROCEDURE — 76820 UMBILICAL ARTERY ECHO: CPT

## 2019-10-15 PROCEDURE — 1220000001 HC SEMI PRIVATE L&D R&B

## 2019-10-15 PROCEDURE — 81001 URINALYSIS AUTO W/SCOPE: CPT

## 2019-10-15 PROCEDURE — 76820 UMBILICAL ARTERY ECHO: CPT | Performed by: OBSTETRICS & GYNECOLOGY

## 2019-10-15 PROCEDURE — 6360000002 HC RX W HCPCS: Performed by: FAMILY MEDICINE

## 2019-10-15 RX ORDER — PENICILLIN G POTASSIUM 5000000 [IU]/1
INJECTION, POWDER, FOR SOLUTION INTRAMUSCULAR; INTRAVENOUS
Status: DISPENSED
Start: 2019-10-15 | End: 2019-10-16

## 2019-10-15 RX ORDER — SODIUM CHLORIDE, SODIUM LACTATE, POTASSIUM CHLORIDE, CALCIUM CHLORIDE 600; 310; 30; 20 MG/100ML; MG/100ML; MG/100ML; MG/100ML
INJECTION, SOLUTION INTRAVENOUS CONTINUOUS
Status: DISCONTINUED | OUTPATIENT
Start: 2019-10-15 | End: 2019-10-18 | Stop reason: HOSPADM

## 2019-10-15 RX ORDER — ONDANSETRON 2 MG/ML
4 INJECTION INTRAMUSCULAR; INTRAVENOUS EVERY 6 HOURS PRN
Status: DISCONTINUED | OUTPATIENT
Start: 2019-10-15 | End: 2019-10-16 | Stop reason: HOSPADM

## 2019-10-15 RX ORDER — PENICILLIN G 3000000 [IU]/50ML
3 INJECTION, SOLUTION INTRAVENOUS EVERY 4 HOURS
Status: DISCONTINUED | OUTPATIENT
Start: 2019-10-16 | End: 2019-10-18 | Stop reason: HOSPADM

## 2019-10-15 RX ORDER — SODIUM CHLORIDE 0.9 % (FLUSH) 0.9 %
10 SYRINGE (ML) INJECTION EVERY 12 HOURS SCHEDULED
Status: DISCONTINUED | OUTPATIENT
Start: 2019-10-15 | End: 2019-10-16 | Stop reason: HOSPADM

## 2019-10-15 RX ORDER — DOCUSATE SODIUM 100 MG/1
100 CAPSULE, LIQUID FILLED ORAL 2 TIMES DAILY
Status: DISCONTINUED | OUTPATIENT
Start: 2019-10-15 | End: 2019-10-18 | Stop reason: HOSPADM

## 2019-10-15 RX ORDER — SODIUM CHLORIDE 0.9 % (FLUSH) 0.9 %
10 SYRINGE (ML) INJECTION PRN
Status: DISCONTINUED | OUTPATIENT
Start: 2019-10-15 | End: 2019-10-16 | Stop reason: HOSPADM

## 2019-10-15 RX ADMIN — DEXTROSE MONOHYDRATE 5 MILLION UNITS: 50 INJECTION, SOLUTION INTRAVENOUS at 20:51

## 2019-10-15 RX ADMIN — SODIUM CHLORIDE, POTASSIUM CHLORIDE, SODIUM LACTATE AND CALCIUM CHLORIDE: 600; 310; 30; 20 INJECTION, SOLUTION INTRAVENOUS at 16:38

## 2019-10-15 RX ADMIN — SODIUM CHLORIDE, POTASSIUM CHLORIDE, SODIUM LACTATE AND CALCIUM CHLORIDE: 600; 310; 30; 20 INJECTION, SOLUTION INTRAVENOUS at 12:29

## 2019-10-16 ENCOUNTER — ANESTHESIA (OUTPATIENT)
Dept: LABOR AND DELIVERY | Age: 29
DRG: 560 | End: 2019-10-16
Payer: COMMERCIAL

## 2019-10-16 ENCOUNTER — ANESTHESIA EVENT (OUTPATIENT)
Dept: LABOR AND DELIVERY | Age: 29
DRG: 560 | End: 2019-10-16
Payer: COMMERCIAL

## 2019-10-16 PROCEDURE — 7200000001 HC VAGINAL DELIVERY

## 2019-10-16 PROCEDURE — 51701 INSERT BLADDER CATHETER: CPT

## 2019-10-16 PROCEDURE — 2500000003 HC RX 250 WO HCPCS: Performed by: ANESTHESIOLOGY

## 2019-10-16 PROCEDURE — 6370000000 HC RX 637 (ALT 250 FOR IP): Performed by: FAMILY MEDICINE

## 2019-10-16 PROCEDURE — 88307 TISSUE EXAM BY PATHOLOGIST: CPT

## 2019-10-16 PROCEDURE — 6360000002 HC RX W HCPCS: Performed by: FAMILY MEDICINE

## 2019-10-16 PROCEDURE — 1220000000 HC SEMI PRIVATE OB R&B

## 2019-10-16 PROCEDURE — 59050 FETAL MONITOR W/REPORT: CPT

## 2019-10-16 PROCEDURE — 3700000025 EPIDURAL BLOCK: Performed by: ANESTHESIOLOGY

## 2019-10-16 PROCEDURE — 2580000003 HC RX 258: Performed by: FAMILY MEDICINE

## 2019-10-16 RX ORDER — ACETAMINOPHEN 650 MG
TABLET, EXTENDED RELEASE ORAL
Status: DISPENSED
Start: 2019-10-16 | End: 2019-10-17

## 2019-10-16 RX ORDER — IBUPROFEN 800 MG/1
800 TABLET ORAL EVERY 8 HOURS
Status: DISCONTINUED | OUTPATIENT
Start: 2019-10-16 | End: 2019-10-18 | Stop reason: HOSPADM

## 2019-10-16 RX ORDER — PANTOPRAZOLE SODIUM 40 MG/1
40 TABLET, DELAYED RELEASE ORAL DAILY
Status: DISCONTINUED | OUTPATIENT
Start: 2019-10-16 | End: 2019-10-18 | Stop reason: HOSPADM

## 2019-10-16 RX ORDER — DOCUSATE SODIUM 100 MG/1
100 CAPSULE, LIQUID FILLED ORAL 2 TIMES DAILY
Status: DISCONTINUED | OUTPATIENT
Start: 2019-10-16 | End: 2019-10-18 | Stop reason: HOSPADM

## 2019-10-16 RX ORDER — METHYLERGONOVINE MALEATE 0.2 MG/ML
200 INJECTION INTRAVENOUS
Status: ACTIVE | OUTPATIENT
Start: 2019-10-16 | End: 2019-10-16

## 2019-10-16 RX ORDER — ONDANSETRON 4 MG/1
8 TABLET, FILM COATED ORAL EVERY 8 HOURS PRN
Status: DISCONTINUED | OUTPATIENT
Start: 2019-10-16 | End: 2019-10-18 | Stop reason: HOSPADM

## 2019-10-16 RX ORDER — FERROUS SULFATE 325(65) MG
325 TABLET ORAL 2 TIMES DAILY WITH MEALS
Status: DISCONTINUED | OUTPATIENT
Start: 2019-10-16 | End: 2019-10-18 | Stop reason: HOSPADM

## 2019-10-16 RX ORDER — LANOLIN 100 %
OINTMENT (GRAM) TOPICAL PRN
Status: DISCONTINUED | OUTPATIENT
Start: 2019-10-16 | End: 2019-10-18 | Stop reason: HOSPADM

## 2019-10-16 RX ORDER — NALBUPHINE HCL 10 MG/ML
5 AMPUL (ML) INJECTION EVERY 4 HOURS PRN
Status: DISCONTINUED | OUTPATIENT
Start: 2019-10-16 | End: 2019-10-16 | Stop reason: HOSPADM

## 2019-10-16 RX ORDER — SIMETHICONE 80 MG
80 TABLET,CHEWABLE ORAL EVERY 6 HOURS PRN
Status: DISCONTINUED | OUTPATIENT
Start: 2019-10-16 | End: 2019-10-18 | Stop reason: HOSPADM

## 2019-10-16 RX ORDER — POLYETHYLENE GLYCOL 3350 17 G/17G
17 POWDER, FOR SOLUTION ORAL DAILY
Status: DISCONTINUED | OUTPATIENT
Start: 2019-10-16 | End: 2019-10-18 | Stop reason: HOSPADM

## 2019-10-16 RX ORDER — LIDOCAINE HYDROCHLORIDE 10 MG/ML
INJECTION, SOLUTION INFILTRATION; PERINEURAL
Status: DISCONTINUED
Start: 2019-10-16 | End: 2019-10-16 | Stop reason: WASHOUT

## 2019-10-16 RX ORDER — ONDANSETRON 2 MG/ML
4 INJECTION INTRAMUSCULAR; INTRAVENOUS EVERY 6 HOURS PRN
Status: DISCONTINUED | OUTPATIENT
Start: 2019-10-16 | End: 2019-10-16 | Stop reason: HOSPADM

## 2019-10-16 RX ORDER — ACETAMINOPHEN 325 MG/1
650 TABLET ORAL EVERY 4 HOURS PRN
Status: DISCONTINUED | OUTPATIENT
Start: 2019-10-16 | End: 2019-10-18 | Stop reason: HOSPADM

## 2019-10-16 RX ORDER — BISACODYL 10 MG
10 SUPPOSITORY, RECTAL RECTAL DAILY PRN
Status: DISCONTINUED | OUTPATIENT
Start: 2019-10-16 | End: 2019-10-18 | Stop reason: HOSPADM

## 2019-10-16 RX ORDER — SODIUM CHLORIDE 0.9 % (FLUSH) 0.9 %
10 SYRINGE (ML) INJECTION PRN
Status: DISCONTINUED | OUTPATIENT
Start: 2019-10-16 | End: 2019-10-18 | Stop reason: HOSPADM

## 2019-10-16 RX ORDER — SODIUM CHLORIDE, SODIUM LACTATE, POTASSIUM CHLORIDE, CALCIUM CHLORIDE 600; 310; 30; 20 MG/100ML; MG/100ML; MG/100ML; MG/100ML
INJECTION, SOLUTION INTRAVENOUS CONTINUOUS
Status: DISCONTINUED | OUTPATIENT
Start: 2019-10-16 | End: 2019-10-18 | Stop reason: HOSPADM

## 2019-10-16 RX ORDER — NALOXONE HYDROCHLORIDE 0.4 MG/ML
0.4 INJECTION, SOLUTION INTRAMUSCULAR; INTRAVENOUS; SUBCUTANEOUS PRN
Status: DISCONTINUED | OUTPATIENT
Start: 2019-10-16 | End: 2019-10-16 | Stop reason: HOSPADM

## 2019-10-16 RX ORDER — EPHEDRINE SULFATE/0.9% NACL/PF 50 MG/5 ML
5 SYRINGE (ML) INTRAVENOUS PRN
Status: DISCONTINUED | OUTPATIENT
Start: 2019-10-16 | End: 2019-10-18 | Stop reason: HOSPADM

## 2019-10-16 RX ORDER — SODIUM CHLORIDE 0.9 % (FLUSH) 0.9 %
10 SYRINGE (ML) INJECTION EVERY 12 HOURS SCHEDULED
Status: DISCONTINUED | OUTPATIENT
Start: 2019-10-16 | End: 2019-10-18 | Stop reason: HOSPADM

## 2019-10-16 RX ADMIN — IBUPROFEN 800 MG: 800 TABLET ORAL at 18:20

## 2019-10-16 RX ADMIN — Medication 5 ML: at 10:39

## 2019-10-16 RX ADMIN — Medication 10 ML: at 20:16

## 2019-10-16 RX ADMIN — SODIUM CHLORIDE, POTASSIUM CHLORIDE, SODIUM LACTATE AND CALCIUM CHLORIDE: 600; 310; 30; 20 INJECTION, SOLUTION INTRAVENOUS at 01:40

## 2019-10-16 RX ADMIN — Medication 2 ML: at 10:46

## 2019-10-16 RX ADMIN — SODIUM CHLORIDE, POTASSIUM CHLORIDE, SODIUM LACTATE AND CALCIUM CHLORIDE: 600; 310; 30; 20 INJECTION, SOLUTION INTRAVENOUS at 11:46

## 2019-10-16 RX ADMIN — Medication 15 ML/HR: at 10:48

## 2019-10-16 RX ADMIN — PENICILLIN G 3 MILLION UNITS: 3000000 INJECTION, SOLUTION INTRAVENOUS at 01:00

## 2019-10-16 RX ADMIN — PENICILLIN G 3 MILLION UNITS: 3000000 INJECTION, SOLUTION INTRAVENOUS at 13:10

## 2019-10-16 RX ADMIN — PENICILLIN G 3 MILLION UNITS: 3000000 INJECTION, SOLUTION INTRAVENOUS at 09:04

## 2019-10-16 RX ADMIN — PENICILLIN G 3 MILLION UNITS: 3000000 INJECTION, SOLUTION INTRAVENOUS at 05:04

## 2019-10-16 RX ADMIN — Medication 1 MILLI-UNITS/MIN: at 03:00

## 2019-10-16 RX ADMIN — SODIUM CHLORIDE, POTASSIUM CHLORIDE, SODIUM LACTATE AND CALCIUM CHLORIDE: 600; 310; 30; 20 INJECTION, SOLUTION INTRAVENOUS at 13:13

## 2019-10-16 RX ADMIN — BUTORPHANOL TARTRATE 1 MG: 2 INJECTION, SOLUTION INTRAMUSCULAR; INTRAVENOUS at 08:56

## 2019-10-16 RX ADMIN — DOCUSATE SODIUM 100 MG: 100 CAPSULE, LIQUID FILLED ORAL at 20:15

## 2019-10-16 RX ADMIN — SODIUM CHLORIDE, POTASSIUM CHLORIDE, SODIUM LACTATE AND CALCIUM CHLORIDE: 600; 310; 30; 20 INJECTION, SOLUTION INTRAVENOUS at 10:15

## 2019-10-16 RX ADMIN — Medication 3 ML: at 10:42

## 2019-10-16 RX ADMIN — Medication 5 MG: at 11:55

## 2019-10-16 ASSESSMENT — PAIN SCALES - GENERAL
PAINLEVEL_OUTOF10: 0
PAINLEVEL_OUTOF10: 4

## 2019-10-17 LAB
BASOPHILS ABSOLUTE: 0.02 E9/L (ref 0–0.2)
BASOPHILS RELATIVE PERCENT: 0.2 % (ref 0–2)
EOSINOPHILS ABSOLUTE: 0.12 E9/L (ref 0.05–0.5)
EOSINOPHILS RELATIVE PERCENT: 0.9 % (ref 0–6)
HCT VFR BLD CALC: 32 % (ref 34–48)
HEMOGLOBIN: 10.7 G/DL (ref 11.5–15.5)
IMMATURE GRANULOCYTES #: 0.06 E9/L
IMMATURE GRANULOCYTES %: 0.5 % (ref 0–5)
LYMPHOCYTES ABSOLUTE: 1.95 E9/L (ref 1.5–4)
LYMPHOCYTES RELATIVE PERCENT: 14.8 % (ref 20–42)
MCH RBC QN AUTO: 30.6 PG (ref 26–35)
MCHC RBC AUTO-ENTMCNC: 33.4 % (ref 32–34.5)
MCV RBC AUTO: 91.4 FL (ref 80–99.9)
MONOCYTES ABSOLUTE: 1.01 E9/L (ref 0.1–0.95)
MONOCYTES RELATIVE PERCENT: 7.7 % (ref 2–12)
NEUTROPHILS ABSOLUTE: 9.98 E9/L (ref 1.8–7.3)
NEUTROPHILS RELATIVE PERCENT: 75.9 % (ref 43–80)
PDW BLD-RTO: 13.9 FL (ref 11.5–15)
PLATELET # BLD: 183 E9/L (ref 130–450)
PMV BLD AUTO: 11.7 FL (ref 7–12)
RBC # BLD: 3.5 E12/L (ref 3.5–5.5)
URINE CULTURE, ROUTINE: NORMAL
WBC # BLD: 13.1 E9/L (ref 4.5–11.5)

## 2019-10-17 PROCEDURE — 85025 COMPLETE CBC W/AUTO DIFF WBC: CPT

## 2019-10-17 PROCEDURE — 6370000000 HC RX 637 (ALT 250 FOR IP): Performed by: FAMILY MEDICINE

## 2019-10-17 PROCEDURE — 1220000000 HC SEMI PRIVATE OB R&B

## 2019-10-17 PROCEDURE — 36415 COLL VENOUS BLD VENIPUNCTURE: CPT

## 2019-10-17 RX ADMIN — ACETAMINOPHEN 650 MG: 325 TABLET ORAL at 16:18

## 2019-10-17 RX ADMIN — DOCUSATE SODIUM 100 MG: 100 CAPSULE, LIQUID FILLED ORAL at 10:14

## 2019-10-17 RX ADMIN — IBUPROFEN 800 MG: 800 TABLET ORAL at 10:14

## 2019-10-17 RX ADMIN — DOCUSATE SODIUM 100 MG: 100 CAPSULE, LIQUID FILLED ORAL at 21:17

## 2019-10-17 RX ADMIN — IBUPROFEN 800 MG: 800 TABLET ORAL at 01:51

## 2019-10-17 RX ADMIN — ACETAMINOPHEN 650 MG: 325 TABLET ORAL at 00:43

## 2019-10-17 RX ADMIN — DOCUSATE SODIUM 100 MG: 100 CAPSULE, LIQUID FILLED ORAL at 19:53

## 2019-10-17 RX ADMIN — ACETAMINOPHEN 650 MG: 325 TABLET ORAL at 21:17

## 2019-10-17 RX ADMIN — IBUPROFEN 800 MG: 800 TABLET ORAL at 19:53

## 2019-10-17 ASSESSMENT — PAIN DESCRIPTION - LOCATION
LOCATION: BACK
LOCATION: BACK

## 2019-10-17 ASSESSMENT — PAIN SCALES - GENERAL
PAINLEVEL_OUTOF10: 5
PAINLEVEL_OUTOF10: 3
PAINLEVEL_OUTOF10: 5
PAINLEVEL_OUTOF10: 6
PAINLEVEL_OUTOF10: 0
PAINLEVEL_OUTOF10: 5
PAINLEVEL_OUTOF10: 3

## 2019-10-17 ASSESSMENT — PAIN DESCRIPTION - ORIENTATION
ORIENTATION: LOWER
ORIENTATION: LOWER

## 2019-10-17 ASSESSMENT — PAIN DESCRIPTION - PROGRESSION
CLINICAL_PROGRESSION: GRADUALLY WORSENING
CLINICAL_PROGRESSION: GRADUALLY WORSENING
CLINICAL_PROGRESSION: GRADUALLY IMPROVING

## 2019-10-17 ASSESSMENT — PAIN DESCRIPTION - PAIN TYPE
TYPE: ACUTE PAIN
TYPE: ACUTE PAIN

## 2019-10-17 ASSESSMENT — PAIN DESCRIPTION - DESCRIPTORS
DESCRIPTORS: CRAMPING
DESCRIPTORS: CRAMPING

## 2019-10-17 ASSESSMENT — PAIN DESCRIPTION - RADICULAR PAIN: RADICULAR_PAIN: ABSENT

## 2019-10-17 ASSESSMENT — PAIN DESCRIPTION - FREQUENCY: FREQUENCY: INTERMITTENT

## 2019-10-18 VITALS
SYSTOLIC BLOOD PRESSURE: 105 MMHG | HEIGHT: 66 IN | RESPIRATION RATE: 18 BRPM | WEIGHT: 126 LBS | DIASTOLIC BLOOD PRESSURE: 64 MMHG | TEMPERATURE: 98.4 F | HEART RATE: 77 BPM | BODY MASS INDEX: 20.25 KG/M2

## 2019-10-18 PROCEDURE — 6370000000 HC RX 637 (ALT 250 FOR IP): Performed by: FAMILY MEDICINE

## 2019-10-18 PROCEDURE — 6360000002 HC RX W HCPCS: Performed by: FAMILY MEDICINE

## 2019-10-18 PROCEDURE — 90715 TDAP VACCINE 7 YRS/> IM: CPT | Performed by: FAMILY MEDICINE

## 2019-10-18 PROCEDURE — 90686 IIV4 VACC NO PRSV 0.5 ML IM: CPT | Performed by: FAMILY MEDICINE

## 2019-10-18 PROCEDURE — G0008 ADMIN INFLUENZA VIRUS VAC: HCPCS | Performed by: FAMILY MEDICINE

## 2019-10-18 PROCEDURE — 90471 IMMUNIZATION ADMIN: CPT | Performed by: FAMILY MEDICINE

## 2019-10-18 RX ORDER — FERROUS SULFATE 325(65) MG
325 TABLET ORAL 2 TIMES DAILY WITH MEALS
Qty: 30 TABLET | Refills: 3 | Status: SHIPPED | OUTPATIENT
Start: 2019-10-18

## 2019-10-18 RX ORDER — IBUPROFEN 800 MG/1
800 TABLET ORAL EVERY 8 HOURS
Qty: 120 TABLET | Refills: 3 | Status: SHIPPED | OUTPATIENT
Start: 2019-10-18

## 2019-10-18 RX ADMIN — INFLUENZA A VIRUS A/BRISBANE/02/2018 IVR-190 (H1N1) ANTIGEN (PROPIOLACTONE INACTIVATED), INFLUENZA A VIRUS A/KANSAS/14/2017 X-327 (H3N2) ANTIGEN (PROPIOLACTONE INACTIVATED), INFLUENZA B VIRUS B/MARYLAND/15/2016 ANTIGEN (PROPIOLACTONE INACTIVATED), INFLUENZA B VIRUS B/PHUKET/3073/2013 BVR-1B ANTIGEN (PROPIOLACTONE INACTIVATED) 0.5 ML: 15; 15; 15; 15 INJECTION, SUSPENSION INTRAMUSCULAR at 14:44

## 2019-10-18 RX ADMIN — TETANUS TOXOID, REDUCED DIPHTHERIA TOXOID AND ACELLULAR PERTUSSIS VACCINE, ADSORBED 0.5 ML: 5; 2.5; 8; 8; 2.5 SUSPENSION INTRAMUSCULAR at 14:43

## 2019-10-18 RX ADMIN — Medication: at 09:20

## 2019-10-18 RX ADMIN — POLYETHYLENE GLYCOL 3350 17 G: 17 POWDER, FOR SOLUTION ORAL at 14:41

## 2019-10-18 RX ADMIN — DOCUSATE SODIUM 100 MG: 100 CAPSULE, LIQUID FILLED ORAL at 09:15

## 2019-10-18 RX ADMIN — IBUPROFEN 800 MG: 800 TABLET ORAL at 19:54

## 2019-10-18 RX ADMIN — IBUPROFEN 800 MG: 800 TABLET ORAL at 09:15

## 2019-10-18 RX ADMIN — DOCUSATE SODIUM 100 MG: 100 CAPSULE, LIQUID FILLED ORAL at 19:54

## 2019-10-18 ASSESSMENT — PAIN DESCRIPTION - PAIN TYPE: TYPE: ACUTE PAIN

## 2019-10-18 ASSESSMENT — PAIN DESCRIPTION - DESCRIPTORS: DESCRIPTORS: CRAMPING

## 2019-10-18 ASSESSMENT — PAIN - FUNCTIONAL ASSESSMENT: PAIN_FUNCTIONAL_ASSESSMENT: ACTIVITIES ARE NOT PREVENTED

## 2019-10-18 ASSESSMENT — PAIN SCALES - GENERAL
PAINLEVEL_OUTOF10: 5
PAINLEVEL_OUTOF10: 7
PAINLEVEL_OUTOF10: 4

## 2019-10-18 ASSESSMENT — PAIN DESCRIPTION - FREQUENCY: FREQUENCY: INTERMITTENT

## 2019-10-18 ASSESSMENT — PAIN DESCRIPTION - LOCATION: LOCATION: ABDOMEN

## 2019-12-22 ENCOUNTER — APPOINTMENT (OUTPATIENT)
Dept: GENERAL RADIOLOGY | Age: 29
End: 2019-12-22
Payer: COMMERCIAL

## 2019-12-22 ENCOUNTER — HOSPITAL ENCOUNTER (EMERGENCY)
Age: 29
Discharge: HOME | End: 2019-12-22
Attending: EMERGENCY MEDICINE
Payer: COMMERCIAL

## 2019-12-22 VITALS
HEIGHT: 66 IN | SYSTOLIC BLOOD PRESSURE: 93 MMHG | TEMPERATURE: 99.7 F | DIASTOLIC BLOOD PRESSURE: 61 MMHG | HEART RATE: 97 BPM | BODY MASS INDEX: 17.68 KG/M2 | WEIGHT: 110 LBS | OXYGEN SATURATION: 99 % | RESPIRATION RATE: 18 BRPM

## 2019-12-22 DIAGNOSIS — J11.1 INFLUENZA: Primary | ICD-10-CM

## 2019-12-22 LAB
ANION GAP SERPL CALCULATED.3IONS-SCNC: 12 MMOL/L (ref 7–16)
BASOPHILS ABSOLUTE: 0.01 E9/L (ref 0–0.2)
BASOPHILS RELATIVE PERCENT: 0.2 % (ref 0–2)
BUN BLDV-MCNC: 7 MG/DL (ref 6–20)
CALCIUM SERPL-MCNC: 9.4 MG/DL (ref 8.6–10.2)
CHLORIDE BLD-SCNC: 95 MMOL/L (ref 98–107)
CO2: 27 MMOL/L (ref 22–29)
CREAT SERPL-MCNC: 1 MG/DL (ref 0.5–1)
EKG ATRIAL RATE: 131 BPM
EKG P AXIS: 67 DEGREES
EKG P-R INTERVAL: 128 MS
EKG Q-T INTERVAL: 266 MS
EKG QRS DURATION: 62 MS
EKG QTC CALCULATION (BAZETT): 392 MS
EKG R AXIS: 79 DEGREES
EKG T AXIS: -76 DEGREES
EKG VENTRICULAR RATE: 131 BPM
EOSINOPHILS ABSOLUTE: 0.07 E9/L (ref 0.05–0.5)
EOSINOPHILS RELATIVE PERCENT: 1.1 % (ref 0–6)
GFR AFRICAN AMERICAN: >60
GFR NON-AFRICAN AMERICAN: >60 ML/MIN/1.73
GLUCOSE BLD-MCNC: 76 MG/DL (ref 74–99)
HCT VFR BLD CALC: 38.7 % (ref 34–48)
HEMOGLOBIN: 13.1 G/DL (ref 11.5–15.5)
IMMATURE GRANULOCYTES #: 0.02 E9/L
IMMATURE GRANULOCYTES %: 0.3 % (ref 0–5)
INFLUENZA A BY PCR: NOT DETECTED
INFLUENZA B BY PCR: DETECTED
LACTIC ACID: 0.7 MMOL/L (ref 0.5–2.2)
LYMPHOCYTES ABSOLUTE: 0.35 E9/L (ref 1.5–4)
LYMPHOCYTES RELATIVE PERCENT: 5.6 % (ref 20–42)
MCH RBC QN AUTO: 30 PG (ref 26–35)
MCHC RBC AUTO-ENTMCNC: 33.9 % (ref 32–34.5)
MCV RBC AUTO: 88.8 FL (ref 80–99.9)
MONOCYTES ABSOLUTE: 0.49 E9/L (ref 0.1–0.95)
MONOCYTES RELATIVE PERCENT: 7.9 % (ref 2–12)
NEUTROPHILS ABSOLUTE: 5.28 E9/L (ref 1.8–7.3)
NEUTROPHILS RELATIVE PERCENT: 84.9 % (ref 43–80)
PDW BLD-RTO: 13.2 FL (ref 11.5–15)
PLATELET # BLD: 197 E9/L (ref 130–450)
PMV BLD AUTO: 11 FL (ref 7–12)
POTASSIUM REFLEX MAGNESIUM: 3.9 MMOL/L (ref 3.5–5)
RBC # BLD: 4.36 E12/L (ref 3.5–5.5)
SODIUM BLD-SCNC: 134 MMOL/L (ref 132–146)
STREP GRP A PCR: NEGATIVE
WBC # BLD: 6.2 E9/L (ref 4.5–11.5)

## 2019-12-22 PROCEDURE — 93005 ELECTROCARDIOGRAM TRACING: CPT | Performed by: STUDENT IN AN ORGANIZED HEALTH CARE EDUCATION/TRAINING PROGRAM

## 2019-12-22 PROCEDURE — 85025 COMPLETE CBC W/AUTO DIFF WBC: CPT

## 2019-12-22 PROCEDURE — 2580000003 HC RX 258: Performed by: STUDENT IN AN ORGANIZED HEALTH CARE EDUCATION/TRAINING PROGRAM

## 2019-12-22 PROCEDURE — 83605 ASSAY OF LACTIC ACID: CPT

## 2019-12-22 PROCEDURE — 80048 BASIC METABOLIC PNL TOTAL CA: CPT

## 2019-12-22 PROCEDURE — 6370000000 HC RX 637 (ALT 250 FOR IP): Performed by: STUDENT IN AN ORGANIZED HEALTH CARE EDUCATION/TRAINING PROGRAM

## 2019-12-22 PROCEDURE — 87502 INFLUENZA DNA AMP PROBE: CPT

## 2019-12-22 PROCEDURE — 71046 X-RAY EXAM CHEST 2 VIEWS: CPT

## 2019-12-22 PROCEDURE — 87880 STREP A ASSAY W/OPTIC: CPT

## 2019-12-22 PROCEDURE — 99283 EMERGENCY DEPT VISIT LOW MDM: CPT

## 2019-12-22 RX ORDER — ACETAMINOPHEN 500 MG
1000 TABLET ORAL ONCE
Status: COMPLETED | OUTPATIENT
Start: 2019-12-22 | End: 2019-12-22

## 2019-12-22 RX ORDER — 0.9 % SODIUM CHLORIDE 0.9 %
30 INTRAVENOUS SOLUTION INTRAVENOUS ONCE
Status: COMPLETED | OUTPATIENT
Start: 2019-12-22 | End: 2019-12-22

## 2019-12-22 RX ORDER — OSELTAMIVIR PHOSPHATE 75 MG/1
75 CAPSULE ORAL ONCE
Status: COMPLETED | OUTPATIENT
Start: 2019-12-22 | End: 2019-12-22

## 2019-12-22 RX ORDER — OSELTAMIVIR PHOSPHATE 75 MG/1
75 CAPSULE ORAL 2 TIMES DAILY
Qty: 10 CAPSULE | Refills: 0 | Status: SHIPPED | OUTPATIENT
Start: 2019-12-22 | End: 2019-12-27

## 2019-12-22 RX ORDER — SODIUM CHLORIDE, SODIUM LACTATE, POTASSIUM CHLORIDE, AND CALCIUM CHLORIDE .6; .31; .03; .02 G/100ML; G/100ML; G/100ML; G/100ML
2000 INJECTION, SOLUTION INTRAVENOUS ONCE
Status: COMPLETED | OUTPATIENT
Start: 2019-12-22 | End: 2019-12-22

## 2019-12-22 RX ORDER — ONDANSETRON 4 MG/1
4 TABLET, ORALLY DISINTEGRATING ORAL EVERY 8 HOURS PRN
Qty: 10 TABLET | Refills: 0 | Status: SHIPPED | OUTPATIENT
Start: 2019-12-22

## 2019-12-22 RX ORDER — SODIUM CHLORIDE, SODIUM LACTATE, POTASSIUM CHLORIDE, CALCIUM CHLORIDE 600; 310; 30; 20 MG/100ML; MG/100ML; MG/100ML; MG/100ML
1000 INJECTION, SOLUTION INTRAVENOUS CONTINUOUS
Status: DISCONTINUED | OUTPATIENT
Start: 2019-12-22 | End: 2019-12-22

## 2019-12-22 RX ORDER — IBUPROFEN 800 MG/1
800 TABLET ORAL ONCE
Status: COMPLETED | OUTPATIENT
Start: 2019-12-22 | End: 2019-12-22

## 2019-12-22 RX ADMIN — OSELTAMIVIR PHOSPHATE 75 MG: 75 CAPSULE ORAL at 16:39

## 2019-12-22 RX ADMIN — SODIUM CHLORIDE, POTASSIUM CHLORIDE, SODIUM LACTATE AND CALCIUM CHLORIDE 2000 ML: 600; 310; 30; 20 INJECTION, SOLUTION INTRAVENOUS at 16:47

## 2019-12-22 RX ADMIN — ACETAMINOPHEN 1000 MG: 500 TABLET ORAL at 15:07

## 2019-12-22 RX ADMIN — IBUPROFEN 800 MG: 800 TABLET, FILM COATED ORAL at 16:53

## 2019-12-22 RX ADMIN — SODIUM CHLORIDE 1497 ML: 9 INJECTION, SOLUTION INTRAVENOUS at 15:07

## 2019-12-22 ASSESSMENT — PAIN DESCRIPTION - DESCRIPTORS: DESCRIPTORS: ACHING

## 2019-12-22 ASSESSMENT — PAIN DESCRIPTION - FREQUENCY: FREQUENCY: CONTINUOUS

## 2019-12-22 ASSESSMENT — PAIN DESCRIPTION - ONSET: ONSET: GRADUAL

## 2019-12-22 ASSESSMENT — PAIN DESCRIPTION - ORIENTATION: ORIENTATION: RIGHT;LEFT

## 2019-12-22 ASSESSMENT — PAIN SCALES - GENERAL
PAINLEVEL_OUTOF10: 10
PAINLEVEL_OUTOF10: 8
PAINLEVEL_OUTOF10: 7

## 2019-12-22 ASSESSMENT — PAIN DESCRIPTION - PROGRESSION: CLINICAL_PROGRESSION: GRADUALLY WORSENING

## 2019-12-22 ASSESSMENT — PAIN DESCRIPTION - LOCATION: LOCATION: BREAST

## 2019-12-22 ASSESSMENT — PAIN DESCRIPTION - PAIN TYPE: TYPE: ACUTE PAIN

## 2019-12-26 ASSESSMENT — ENCOUNTER SYMPTOMS
RHINORRHEA: 0
DIARRHEA: 0
SHORTNESS OF BREATH: 0
ABDOMINAL PAIN: 0
TROUBLE SWALLOWING: 0
VOMITING: 0
PHOTOPHOBIA: 0
SORE THROAT: 1
BACK PAIN: 0
NAUSEA: 0
COUGH: 1

## 2020-02-22 ENCOUNTER — HOSPITAL ENCOUNTER (EMERGENCY)
Age: 30
Discharge: HOME | End: 2020-02-22
Payer: COMMERCIAL

## 2020-02-22 VITALS
HEART RATE: 77 BPM | RESPIRATION RATE: 14 BRPM | OXYGEN SATURATION: 99 % | DIASTOLIC BLOOD PRESSURE: 57 MMHG | WEIGHT: 110 LBS | HEIGHT: 66 IN | BODY MASS INDEX: 17.68 KG/M2 | TEMPERATURE: 97.3 F | SYSTOLIC BLOOD PRESSURE: 124 MMHG

## 2020-02-22 LAB
HCG, URINE, POC: NEGATIVE
Lab: NORMAL
NEGATIVE QC PASS/FAIL: NORMAL
POSITIVE QC PASS/FAIL: NORMAL

## 2020-02-22 PROCEDURE — 6370000000 HC RX 637 (ALT 250 FOR IP): Performed by: PHYSICIAN ASSISTANT

## 2020-02-22 PROCEDURE — 6360000002 HC RX W HCPCS: Performed by: PHYSICIAN ASSISTANT

## 2020-02-22 PROCEDURE — 99283 EMERGENCY DEPT VISIT LOW MDM: CPT

## 2020-02-22 PROCEDURE — 96372 THER/PROPH/DIAG INJ SC/IM: CPT

## 2020-02-22 RX ORDER — CETIRIZINE HYDROCHLORIDE 10 MG/1
10 TABLET ORAL DAILY
Qty: 30 TABLET | Refills: 0 | Status: SHIPPED | OUTPATIENT
Start: 2020-02-22 | End: 2020-03-23

## 2020-02-22 RX ORDER — EPINEPHRINE 0.3 MG/.3ML
0.3 INJECTION SUBCUTANEOUS ONCE
Qty: 1 EACH | Refills: 1 | Status: SHIPPED | OUTPATIENT
Start: 2020-02-22 | End: 2020-02-22

## 2020-02-22 RX ORDER — DEXAMETHASONE SODIUM PHOSPHATE 4 MG/ML
10 INJECTION, SOLUTION INTRA-ARTICULAR; INTRALESIONAL; INTRAMUSCULAR; INTRAVENOUS; SOFT TISSUE ONCE
Status: COMPLETED | OUTPATIENT
Start: 2020-02-22 | End: 2020-02-22

## 2020-02-22 RX ORDER — CETIRIZINE HYDROCHLORIDE 10 MG/1
10 TABLET ORAL DAILY
Status: DISCONTINUED | OUTPATIENT
Start: 2020-02-22 | End: 2020-02-22 | Stop reason: HOSPADM

## 2020-02-22 RX ORDER — FAMOTIDINE 20 MG/1
20 TABLET, FILM COATED ORAL 2 TIMES DAILY
Qty: 60 TABLET | Refills: 0 | Status: SHIPPED | OUTPATIENT
Start: 2020-02-22

## 2020-02-22 RX ORDER — METHYLPREDNISOLONE 4 MG/1
TABLET ORAL
Qty: 1 KIT | Refills: 0 | Status: SHIPPED | OUTPATIENT
Start: 2020-02-22 | End: 2020-02-28

## 2020-02-22 RX ADMIN — CETIRIZINE HYDROCHLORIDE 10 MG: 10 TABLET, FILM COATED ORAL at 14:38

## 2020-02-22 RX ADMIN — DEXAMETHASONE SODIUM PHOSPHATE 10 MG: 4 INJECTION, SOLUTION INTRAMUSCULAR; INTRAVENOUS at 14:02

## 2020-02-22 NOTE — ED NOTES
Patient discharged with belongings. Discussed care instructions, follow-up instructions, medications and when to return to the hospital. Patient verbalizes understanding and has no further questions at this time. Electronically signed by Stephanie Davies.  RICA Jaramillo on 2/22/2020 at 3:10 PM       Efraín Barnes RN  02/22/20 [id number]

## 2021-08-16 ENCOUNTER — APPOINTMENT (OUTPATIENT)
Dept: GENERAL RADIOLOGY | Age: 31
End: 2021-08-16
Payer: COMMERCIAL

## 2021-08-16 ENCOUNTER — HOSPITAL ENCOUNTER (EMERGENCY)
Age: 31
Discharge: HOME | End: 2021-08-16
Payer: COMMERCIAL

## 2021-08-16 VITALS
DIASTOLIC BLOOD PRESSURE: 62 MMHG | OXYGEN SATURATION: 100 % | HEART RATE: 82 BPM | RESPIRATION RATE: 16 BRPM | HEIGHT: 67 IN | WEIGHT: 100 LBS | SYSTOLIC BLOOD PRESSURE: 104 MMHG | TEMPERATURE: 98 F | BODY MASS INDEX: 15.7 KG/M2

## 2021-08-16 DIAGNOSIS — Z20.822 EXPOSURE TO COVID-19 VIRUS: Primary | ICD-10-CM

## 2021-08-16 LAB
ANION GAP SERPL CALCULATED.3IONS-SCNC: 10 MMOL/L (ref 7–16)
BASOPHILS ABSOLUTE: 0.02 E9/L (ref 0–0.2)
BASOPHILS RELATIVE PERCENT: 0.3 % (ref 0–2)
BUN BLDV-MCNC: 10 MG/DL (ref 6–20)
CALCIUM SERPL-MCNC: 9.1 MG/DL (ref 8.6–10.2)
CHLORIDE BLD-SCNC: 101 MMOL/L (ref 98–107)
CO2: 26 MMOL/L (ref 22–29)
CREAT SERPL-MCNC: 0.7 MG/DL (ref 0.5–1)
EOSINOPHILS ABSOLUTE: 0.19 E9/L (ref 0.05–0.5)
EOSINOPHILS RELATIVE PERCENT: 3.2 % (ref 0–6)
GFR AFRICAN AMERICAN: >60
GFR NON-AFRICAN AMERICAN: >60 ML/MIN/1.73
GLUCOSE BLD-MCNC: 82 MG/DL (ref 74–99)
HCG, URINE, POC: NEGATIVE
HCT VFR BLD CALC: 35 % (ref 34–48)
HEMOGLOBIN: 11.9 G/DL (ref 11.5–15.5)
IMMATURE GRANULOCYTES #: 0.01 E9/L
IMMATURE GRANULOCYTES %: 0.2 % (ref 0–5)
LYMPHOCYTES ABSOLUTE: 1.79 E9/L (ref 1.5–4)
LYMPHOCYTES RELATIVE PERCENT: 30.5 % (ref 20–42)
Lab: NORMAL
MCH RBC QN AUTO: 30.3 PG (ref 26–35)
MCHC RBC AUTO-ENTMCNC: 34 % (ref 32–34.5)
MCV RBC AUTO: 89.1 FL (ref 80–99.9)
MONOCYTES ABSOLUTE: 0.41 E9/L (ref 0.1–0.95)
MONOCYTES RELATIVE PERCENT: 7 % (ref 2–12)
NEGATIVE QC PASS/FAIL: NORMAL
NEUTROPHILS ABSOLUTE: 3.45 E9/L (ref 1.8–7.3)
NEUTROPHILS RELATIVE PERCENT: 58.8 % (ref 43–80)
PDW BLD-RTO: 12.7 FL (ref 11.5–15)
PLATELET # BLD: 230 E9/L (ref 130–450)
PMV BLD AUTO: 11.1 FL (ref 7–12)
POSITIVE QC PASS/FAIL: NORMAL
POTASSIUM REFLEX MAGNESIUM: 3.9 MMOL/L (ref 3.5–5)
RBC # BLD: 3.93 E12/L (ref 3.5–5.5)
SODIUM BLD-SCNC: 137 MMOL/L (ref 132–146)
TROPONIN, HIGH SENSITIVITY: <6 NG/L (ref 0–9)
WBC # BLD: 5.9 E9/L (ref 4.5–11.5)

## 2021-08-16 PROCEDURE — 71046 X-RAY EXAM CHEST 2 VIEWS: CPT

## 2021-08-16 PROCEDURE — 80048 BASIC METABOLIC PNL TOTAL CA: CPT

## 2021-08-16 PROCEDURE — 93005 ELECTROCARDIOGRAM TRACING: CPT | Performed by: NURSE PRACTITIONER

## 2021-08-16 PROCEDURE — 84484 ASSAY OF TROPONIN QUANT: CPT

## 2021-08-16 PROCEDURE — 6370000000 HC RX 637 (ALT 250 FOR IP): Performed by: PHYSICIAN ASSISTANT

## 2021-08-16 PROCEDURE — U0003 INFECTIOUS AGENT DETECTION BY NUCLEIC ACID (DNA OR RNA); SEVERE ACUTE RESPIRATORY SYNDROME CORONAVIRUS 2 (SARS-COV-2) (CORONAVIRUS DISEASE [COVID-19]), AMPLIFIED PROBE TECHNIQUE, MAKING USE OF HIGH THROUGHPUT TECHNOLOGIES AS DESCRIBED BY CMS-2020-01-R: HCPCS

## 2021-08-16 PROCEDURE — 99284 EMERGENCY DEPT VISIT MOD MDM: CPT

## 2021-08-16 PROCEDURE — U0005 INFEC AGEN DETEC AMPLI PROBE: HCPCS

## 2021-08-16 PROCEDURE — 85025 COMPLETE CBC W/AUTO DIFF WBC: CPT

## 2021-08-16 RX ORDER — IBUPROFEN 600 MG/1
600 TABLET ORAL ONCE
Status: COMPLETED | OUTPATIENT
Start: 2021-08-16 | End: 2021-08-16

## 2021-08-16 RX ORDER — IBUPROFEN 600 MG/1
600 TABLET ORAL 3 TIMES DAILY PRN
Qty: 30 TABLET | Refills: 0 | Status: SHIPPED | OUTPATIENT
Start: 2021-08-16

## 2021-08-16 RX ORDER — GUAIFENESIN AND DEXTROMETHORPHAN HYDROBROMIDE 1200; 60 MG/1; MG/1
1 TABLET, EXTENDED RELEASE ORAL 2 TIMES DAILY PRN
Qty: 14 TABLET | Refills: 0 | Status: SHIPPED | OUTPATIENT
Start: 2021-08-16 | End: 2021-08-23

## 2021-08-16 RX ADMIN — IBUPROFEN 600 MG: 600 TABLET, FILM COATED ORAL at 19:03

## 2021-08-16 ASSESSMENT — ENCOUNTER SYMPTOMS
SINUS PAIN: 0
FACIAL SWELLING: 0
RHINORRHEA: 1
COLOR CHANGE: 0
CONSTIPATION: 0
COUGH: 1
SINUS PRESSURE: 0
CHEST TIGHTNESS: 0
TROUBLE SWALLOWING: 0
SHORTNESS OF BREATH: 0
SORE THROAT: 0
VOMITING: 0
DIARRHEA: 0
NAUSEA: 0
ABDOMINAL PAIN: 0
BACK PAIN: 0

## 2021-08-16 ASSESSMENT — PAIN SCALES - GENERAL: PAINLEVEL_OUTOF10: 4

## 2021-08-16 NOTE — ED NOTES
FIRST PROVIDER CONTACT ASSESSMENT NOTE      Department of Emergency Medicine   8/16/21  4:08 PM EDT    Chief Complaint: Covid Testing (cough, states son was dx with covid yesterday, wants tested ) and Cough      History of Present Illness:   Lois Cabrera is a 27 y.o. female who presents to the ED for cough and chest pain. She states that her son was diagnosed yesterday with COVID-19. Family member came from out of town on Thursday when she was exposed. She states is not a productive cough. He states the chest pain is midsternal nonradiating. Medical History:  has a past medical history of Abnormal Pap smear of cervix, Anemia, Depression, Disease of blood and blood forming organ, Postpartum depression, Seasonal allergies, Seizures ([address] and Sickle cell trait ([facility] [address] Surgical History:  has a past surgical history that includes Procious tooth extraction. Social History:  reports that she has never smoked. She has never used smokeless tobacco. She reports that she does not drink alcohol and does not use drugs. Family History: family history includes Asthma in her brother; Cancer in her maternal aunt; Hypertension in her maternal grandmother; Lupus in her maternal cousin and maternal cousin; No Known Problems in her brother, brother, and sister; Other in her sister; Thyroid Disease in her mother. *ALLERGIES*     Patient has no known allergies.      Physical Exam:      VS:  BP (!) 112/58   Pulse 77   Temp 98 °F (36.7 °C)   Resp 16   Ht 5' 7\" (1.702 m)   Wt 100 lb (45.4 kg)   SpO2 100%   BMI 15.66 kg/m²      Initial Plan of Care:  Initiate Treatment-Testing, Proceed toTreatment Area When Bed Available for ED Attending/MLP to Continue Care    -----------------END OF FIRST PROVIDER CONTACT ASSESSMENT NOTE--------------  Electronically signed by DOMINGO Martinez CNP   DD: 8/16/21             DOMINGO Martinez CNP  08/16/21 [id number]

## 2021-08-16 NOTE — ED NOTES
Discharge instructions given and reviewed with patient. RX given. Instructed to follow up with PCP. Questions and concerns addressed. Pt departed ED ambulatory in no apparent distress with family. Personal belongings taken.      Jim Zarco RN  08/16/21 [id number]

## 2021-08-16 NOTE — ED PROVIDER NOTES
[facility]        Department of Emergency Medicine   ED  Provider Note  Admit Date/RoomTime: 8/16/2021  6:23 PM  ED Room: 30/30  HPI:  8/16/21, Time: 6:49 PM EDT      Patient is a 40-year-old female presenting the emergency department concern for COVID-19. She states that her son tested positive and she was exposed to him yesterday. She states she did start having some intermittent midsternal chest pain yesterday that is worse with coughing. She is also having a dry cough and some congestion mixed with rhinorrhea. Patient has not had a fever. She has not taken any medications for her symptoms. She denies any shortness of breath, sore throat, abdominal pain, nausea/vomiting, constipation or diarrhea, loss of taste or smell. The history is provided by the patient. No  was used. REVIEW OF SYSTEMS:  Review of Systems   Constitutional: Negative for activity change, chills, fatigue and fever. HENT: Positive for congestion and rhinorrhea. Negative for ear pain, facial swelling, sinus pressure, sinus pain, sore throat and trouble swallowing. Respiratory: Positive for cough. Negative for chest tightness and shortness of breath. Cardiovascular: Positive for chest pain. Negative for palpitations and leg swelling. Gastrointestinal: Negative for abdominal pain, constipation, diarrhea, nausea and vomiting. Genitourinary: Negative for dysuria, flank pain, frequency and hematuria. Musculoskeletal: Negative for back pain, neck pain and neck stiffness. Skin: Negative for color change, pallor and rash. Neurological: Negative for dizziness, weakness, light-headedness and headaches. Psychiatric/Behavioral: Negative for agitation, behavioral problems and confusion.       Pertinent positives and negatives are stated within HPI, all other systems reviewed and are negative.      --------------------------------------------- PAST HISTORY ---------------------------------------------  Past Medical History:  has a past medical history of Abnormal Pap smear of cervix, Anemia, Depression, Disease of blood and blood forming organ, Postpartum depression, Seasonal allergies, Seizures ([facility] and Sickle cell trait ([facility] Past Surgical History:  has a past surgical history that includes [place] tooth extraction. Social History:  reports that she has never smoked. She has never used smokeless tobacco. She reports that she does not drink alcohol and does not use drugs. Family History: family history includes Asthma in her brother; Cancer in her maternal aunt; Hypertension in her maternal grandmother; Lupus in her maternal cousin and maternal cousin; No Known Problems in her brother, brother, and sister; Other in her sister; Thyroid Disease in her mother. The patients home medications have been reviewed. Allergies: Patient has no known allergies.     -------------------------------------------------- RESULTS -------------------------------------------------  All laboratory and radiology results have been personally reviewed by myself   LABS:  Results for orders placed or performed during the hospital encounter of 08/16/21   CBC Auto Differential   Result Value Ref Range    WBC 5.9 4.5 - 11.5 E9/L    RBC 3.93 3.50 - 5.50 E12/L    Hemoglobin 11.9 11.5 - 15.5 g/dL    Hematocrit 35.0 34.0 - 48.0 %    MCV 89.1 80.0 - 99.9 fL    MCH 30.3 26.0 - 35.0 pg    MCHC 34.0 32.0 - 34.5 %    RDW 12.7 11.5 - 15.0 fL    Platelets 948 237 - 868 E9/L    MPV 11.1 7.0 - 12.0 fL    Neutrophils % 58.8 43.0 - 80.0 %    Immature Granulocytes % 0.2 0.0 - 5.0 %    Lymphocytes % 30.5 20.0 - 42.0 %    Monocytes % 7.0 2.0 - 12.0 %    Eosinophils % 3.2 0.0 - 6.0 %    Basophils % 0.3 0.0 - 2.0 %    Neutrophils Absolute 3.45 1.80 - 7.30 E9/L    Immature Granulocytes # 0.01 E9/L    Lymphocytes Absolute 1.79 1.50 - 4.00 E9/L    Monocytes Absolute 0.41 0.10 - 0.95 E9/L    Eosinophils Absolute 0.19 0.05 - 0.50 E9/L    Basophils Absolute 0.02 0.00 - 0.20 W7/S   Basic Metabolic Panel w/ Reflex to MG   Result Value Ref Range    Sodium 137 132 - 146 mmol/L    Potassium reflex Magnesium 3.9 3.5 - 5.0 mmol/L    Chloride 101 98 - 107 mmol/L    CO2 26 22 - 29 mmol/L    Anion Gap 10 7 - 16 mmol/L    Glucose 82 74 - 99 mg/dL    BUN 10 6 - 20 mg/dL    CREATININE 0.7 0.5 - 1.0 mg/dL    GFR Non-African American >60 >=60 mL/min/1.73    GFR African American >60     Calcium 9.1 8.6 - 10.2 mg/dL   Troponin   Result Value Ref Range    Troponin, High Sensitivity <6 0 - 9 ng/L   POC Pregnancy Urine   Result Value Ref Range    HCG, Urine, POC Negative Negative    Lot Number [id number]     Positive QC Pass/Fail Pass     Negative QC Pass/Fail Pass        RADIOLOGY:  Interpreted by Radiologist.  XR CHEST (2 VW)   Final Result   No evidence of active cardiopulmonary pathology. ------------------------- NURSING NOTES AND VITALS REVIEWED ---------------------------   The nursing notes within the ED encounter and vital signs as below have been reviewed. /62   Pulse 82   Temp 98 °F (36.7 °C)   Resp 16   Ht 5' 7\" (1.702 m)   Wt 100 lb (45.4 kg)   SpO2 100%   BMI 15.66 kg/m²   Oxygen Saturation Interpretation: Normal      ---------------------------------------------------PHYSICAL EXAM--------------------------------------    Physical Exam  Vitals and nursing note reviewed. Constitutional:       General: She is not in acute distress. Appearance: Normal appearance. She is well-developed. She is not ill-appearing. HENT:      Head: Normocephalic and atraumatic. Mouth/Throat:      Mouth: Mucous membranes are moist.      Pharynx: Oropharynx is clear. Neck:      Vascular: No JVD. Trachea: No tracheal deviation. Cardiovascular:      Rate and Rhythm: Normal rate and regular rhythm. Heart sounds: Normal heart sounds. No murmur heard.      Pulmonary:      Effort: Pulmonary effort is normal. No respiratory distress. Breath sounds: Normal breath sounds. Musculoskeletal:      Cervical back: Normal range of motion and neck supple. No rigidity. Skin:     General: Skin is warm and dry. Capillary Refill: Capillary refill takes less than 2 seconds. Coloration: Skin is not pale. Findings: No erythema. Neurological:      Mental Status: She is alert and oriented to person, place, and time. Mental status is at baseline. Motor: No weakness. Psychiatric:         Mood and Affect: Mood normal.         Behavior: Behavior normal.         Thought Content: Thought content normal.            ------------------------------ ED COURSE/MEDICAL DECISION MAKING----------------------  Medications   ibuprofen (ADVIL;MOTRIN) tablet 600 mg (600 mg Oral Given 8/16/21 1903)         ED COURSE:      XR CHEST (2 VW)   Final Result   No evidence of active cardiopulmonary pathology. Procedures:  Procedures     Medical Decision Making:   MDM   40-year-old female presenting the ED with known COVID-19 exposure and cough, chest pain and rhinorrhea. Patient is afebrile and hemodynamically stable on exam.  She is resting comfortably and overall very well-appearing. Covid send out performed. Patient's labs are unremarkable. ECG shows normal sinus rhythm at 71 bpm.  Patient is educated on quarantine and supportive measures. She is given a prescription for Mucinex and Motrin. She is to follow-up with her primary care physician or return with any new or worsening symptoms. Counseling: The emergency provider has spoken with the patient and discussed todays results, in addition to providing specific details for the plan of care and counseling regarding the diagnosis and prognosis. Questions are answered at this time and they are agreeable with the plan.      --------------------------------- IMPRESSION AND DISPOSITION ---------------------------------    IMPRESSION  1.  Exposure to COVID-19 virus        DISPOSITION  Disposition: Discharge to home  Patient condition is good      Electronically signed by Chantelle Collins PA-C   DD: 8/16/21  **This report was transcribed using voice recognition software. Every effort was made to ensure accuracy; however, inadvertent computerized transcription errors may be present.   END OF ED PROVIDER NOTE         Chantelle Collins PA-C  08/16/21 1131 Zulma Ramos PA-C  08/16/21 [id number]

## 2021-08-17 ENCOUNTER — CARE COORDINATION (OUTPATIENT)
Dept: CARE COORDINATION | Age: 31
End: 2021-08-17

## 2021-08-17 LAB
EKG ATRIAL RATE: 71 BPM
EKG P AXIS: 23 DEGREES
EKG P-R INTERVAL: 104 MS
EKG Q-T INTERVAL: 346 MS
EKG QRS DURATION: 68 MS
EKG QTC CALCULATION (BAZETT): 375 MS
EKG R AXIS: 75 DEGREES
EKG T AXIS: 52 DEGREES
EKG VENTRICULAR RATE: 71 BPM
SARS-COV-2, PCR: NOT DETECTED

## 2021-08-17 PROCEDURE — 93010 ELECTROCARDIOGRAM REPORT: CPT | Performed by: INTERNAL MEDICINE

## 2021-08-17 NOTE — CARE COORDINATION
Attempted to contact pt regarding recent er visit unable to leave a vm d/t mailbox not set up yet will attempt to contact at a later date

## 2021-08-17 NOTE — CARE COORDINATION
Writer phoned Mother for ED follow up for her Children. Writer is [facility] Pediatric Nurse. Mother request her COVID-19 test result. Patient contacted regarding COVID-19 exposure. Discussed COVID-19 related testing which was available at this time. Test results were negative. Patient informed of results, if available? Yes. Care Transition Nurse contacted the patient by telephone to perform post discharge assessment. Call within 2 business days of discharge: Yes. Verified name and  with patient as identifiers. Provided introduction to self, and explanation of the CTN/ACM role, and reason for call due to risk factors for infection and/or exposure to COVID-19. Symptoms reviewed with patient who verbalized the following symptoms: no new symptoms and no worsening symptoms. Due to no new or worsening symptoms encounter was not routed to provider for escalation. Discussed follow-up appointments. If no appointment was previously scheduled, appointment scheduling offered: No and Patient is currently at the ED with her . He is COVID-19 positive. [facility] follow up appointment(s): No future appointments. Non-[facility] follow up appointment(s):     Non-face-to-face services provided:  Obtained and reviewed discharge summary and/or continuity of care documents     Advance Care Planning:   Does patient have an Advance Directive:  reviewed and current. Patient doesn't have another contact for the chart. Yaneth Auguste is her only contact and he is currently in ED with COVID-19. Educated patient about risk for severe COVID-19 due to risk factors according to CDC guidelines. CTN reviewed discharge instructions, medical action plan and red flag symptoms with the patient who verbalized understanding. Discussed COVID vaccination status: No. Education provided on COVID-19 vaccination as appropriate. Discussed exposure protocols and quarantine with CDC Guidelines.  Patient was given an opportunity to verbalize any questions and concerns and agrees to contact CTN or health care provider for questions related to their healthcare. Reviewed and educated patient on any new and changed medications related to discharge diagnosis     Was patient discharged with a pulse oximeter? No Discussed and confirmed pulse oximeter discharge instructions and when to notify provider or seek emergency care. CTN provided contact information. Plan for follow-up call in 3-5 days based on severity of symptoms and risk factors.

## 2021-08-20 NOTE — CARE COORDINATION
Spoke to pts mother and informed of negative test results and she states pt is starting to feel better and has followed up with pcp and no need for further calls. . will close this encounter

## 2023-04-24 NOTE — ED PROVIDER NOTES
[facility]  [address]  [address]  [place] [address]  Dept: [phone]  Dept Fax: [phone]       Aditi Maddox :1985 MRN:[id number]    2023 Time Session Began:0830 Time Session Ended: 915    This visit was performed via live interactive two-way Video visit with patient's verbal consent.   Clinician Location:Home.  Patient Location: Home.  Verified patient identity:  [x] Yes    Session Type:Therapy 38-52 minutes (51015)    Others Present: na    Intervention: Behavioral, Cognitive    Suicide/Homicide/Violence Ideation: No    If Yes, explain: na    Current Outpatient Medications   Medication Sig   • escitalopram (LEXAPRO) 10 MG tablet TAKE 1 TABLET BY MOUTH DAILY   • azithromycin (ZITHROMAX) 250 MG tablet 250 tablets.   • meclizine (ANTIVERT) 25 MG tablet 25 mg.   • PARoxetine (PAXIL) 20 MG tablet Take 20 mg by mouth daily.   • predniSONE (DELTASONE) 10 MG tablet 10 mg.   • doxycycline hyclate (VIBRAMYCIN) 100 MG capsule Take 1 capsule by mouth in the morning and 1 capsule in the evening.   • hydrOXYzine (ATARAX) 25 MG tablet Take 1 tablet by mouth every 6 hours as needed for Anxiety.   • fluticasone (FLONASE) 50 MCG/ACT nasal spray Spray 2 sprays in each nostril daily.   • fluconazole (Diflucan) 150 MG tablet Take one tablet today and repeat after antibiotics   • amitriptyline (ELAVIL) 10 MG tablet Take 3 tablets by mouth nightly. 2, 10 mg tablets nightly   • Ocrelizumab (OCREVUS IV)    • levonorgestrel (MIRENA) (52 MG) 20 MCG/DAY intrauterine device 1 each by Intrauterine route once. Inserted today, unable to perform Novasure today   • ALPRAZolam (XANAX) 0.25 MG tablet Take 1-2 tabs by mouth as needed for anxiety.   • ibuprofen (MOTRIN) 600 MG tablet Take 1 tablet by mouth every 6 hours as needed for Pain.     No current facility-administered medications for this visit.     Facility-Administered Medications Ordered in Other Visits   Medication   • acetaminophen  Independent      HPI:  2/22/20,   Time: 1:35 PM         Vladimir Brooks is a 34 y.o. female presenting to the ED for rash, beginning weeks ago. Patient states she has been diagnosed with idiopathic urticaria. She has seen her PCP for this and has been taking Zyrtec and Pepcid at home. She states her whole body feels itchy and she breaks out in hive like rashes intermittently. She feels nothing is working. She denies oral swelling or wheezing. Patient denies any fever, chills, fatigue, headache, chest pain, shortness of breath, cough, wheezing, abdominal pain, nausea, vomiting, diarrhea, constipation, numbness, weakness. ROS:   Pertinent positives and negatives are stated within HPI, all other systems reviewed and are negative.  --------------------------------------------- PAST HISTORY ---------------------------------------------  Past Medical History:  has a past medical history of Abnormal Pap smear of cervix, Anemia, Depression, Disease of blood and blood forming organ, Postpartum depression, Seasonal allergies, Seizures ([facility] and Sickle cell trait ([facility] Past Surgical History:  has a past surgical history that includes [place] tooth extraction. Social History:  reports that she has never smoked. She has never used smokeless tobacco. She reports that she does not drink alcohol or use drugs. Family History: family history includes Asthma in her brother; Cancer in her maternal aunt; Hypertension in her maternal grandmother; Lupus in her maternal cousin and maternal cousin; No Known Problems in her brother, brother, and sister; Other in her sister; Thyroid Disease in her mother. The patients home medications have been reviewed. Allergies: Patient has no known allergies.     -------------------------------------------------- RESULTS -------------------------------------------------  All laboratory and radiology results have been personally reviewed by myself   LABS:  Results for orders placed or (TYLENOL) tablet 650 mg   • prochlorperazine (COMPAZINE) tablet 5 mg       Change in Medication(s) Reported: No  If Yes, explain: na    Patient/Family Education Provided: Yes  Patient/Family Displays Understanding: Yes    If No, explain: na     Chief complaint in patient's own words: \"acceptance\"    Progress Note containing chief complaint and symptoms/problems related to the complaint:    (Data/Action/Response/Plan)    D: Patient addressed continuing to wait on her next health appointment, \"I don't ask questions if I know it is not good news, but I don't know how deal with the bad news either.\"  Patient disclosed not being open with others related to her health, her children being unaware of her MS and her roommate having minimal knowledge, \"I am sure she sees things, I am slow to get move or to get off the couch as easily.\" Patient inquired about how to decrease her stress, \"that is what they tell me; I need to lower my stress because it is making my symptoms worse.\"   A: Discussed self care and patient's comfort/readiness to communicate with her supports and children about her health. Writer addressed honest processing and how to work toward acceptance by asking questions of her doctors (versus ignoring changes in health out of fear of what she will hear). Introduced relaxation techniques and writer will send information through portal for more options.  Patient did not endorse thoughts of self harm or suicide. R: Patient had an blunted affect and anxious/depressed mood. P: Outpatient therapy 2x monthly to address mood, recommendation for self care, assessment of how and if she wants to convey boundaries in relationship/social interactions.    Need for Community Resources Assessed: Yes    Resources Needed: No    If Yes, what resources: na    Primary Diagnosis: PTSD/MDD, recurrent episode, moderate    Treatment Plan:  3 of 6    Discharge Plan: Strategies Discussed to Maintain Gains    Next Appointment: 2  farrah Brar, LPC

## 2024-03-06 ENCOUNTER — HOSPITAL ENCOUNTER (EMERGENCY)
Facility: HOSPITAL | Age: 34
Discharge: HOME | End: 2024-03-06
Payer: COMMERCIAL

## 2024-03-06 VITALS
HEART RATE: 94 BPM | OXYGEN SATURATION: 97 % | HEIGHT: 67 IN | WEIGHT: 100 LBS | RESPIRATION RATE: 16 BRPM | BODY MASS INDEX: 15.7 KG/M2 | DIASTOLIC BLOOD PRESSURE: 44 MMHG | TEMPERATURE: 98.6 F | SYSTOLIC BLOOD PRESSURE: 110 MMHG

## 2024-03-06 PROCEDURE — 4500999001 HC ED NO CHARGE

## 2024-03-06 ASSESSMENT — COLUMBIA-SUICIDE SEVERITY RATING SCALE - C-SSRS
6. HAVE YOU EVER DONE ANYTHING, STARTED TO DO ANYTHING, OR PREPARED TO DO ANYTHING TO END YOUR LIFE?: NO
2. HAVE YOU ACTUALLY HAD ANY THOUGHTS OF KILLING YOURSELF?: NO
1. IN THE PAST MONTH, HAVE YOU WISHED YOU WERE DEAD OR WISHED YOU COULD GO TO SLEEP AND NOT WAKE UP?: NO